# Patient Record
Sex: FEMALE | Race: WHITE
[De-identification: names, ages, dates, MRNs, and addresses within clinical notes are randomized per-mention and may not be internally consistent; named-entity substitution may affect disease eponyms.]

---

## 2018-02-16 ENCOUNTER — APPOINTMENT (OUTPATIENT)
Dept: FAMILY MEDICINE | Facility: CLINIC | Age: 43
End: 2018-02-16
Payer: COMMERCIAL

## 2018-02-16 ENCOUNTER — NON-APPOINTMENT (OUTPATIENT)
Age: 43
End: 2018-02-16

## 2018-02-16 ENCOUNTER — LABORATORY RESULT (OUTPATIENT)
Age: 43
End: 2018-02-16

## 2018-02-16 VITALS
OXYGEN SATURATION: 98 % | WEIGHT: 155 LBS | BODY MASS INDEX: 27.46 KG/M2 | DIASTOLIC BLOOD PRESSURE: 70 MMHG | HEIGHT: 63 IN | RESPIRATION RATE: 16 BRPM | HEART RATE: 84 BPM | SYSTOLIC BLOOD PRESSURE: 118 MMHG

## 2018-02-16 DIAGNOSIS — Z82.49 FAMILY HISTORY OF ISCHEMIC HEART DISEASE AND OTHER DISEASES OF THE CIRCULATORY SYSTEM: ICD-10-CM

## 2018-02-16 DIAGNOSIS — Z91.018 ALLERGY TO OTHER FOODS: ICD-10-CM

## 2018-02-16 DIAGNOSIS — K57.30 DIVERTICULOSIS OF LARGE INTESTINE W/OUT PERFORATION OR ABSCESS W/OUT BLEEDING: ICD-10-CM

## 2018-02-16 DIAGNOSIS — Z87.891 PERSONAL HISTORY OF NICOTINE DEPENDENCE: ICD-10-CM

## 2018-02-16 DIAGNOSIS — Z87.19 PERSONAL HISTORY OF OTHER DISEASES OF THE DIGESTIVE SYSTEM: ICD-10-CM

## 2018-02-16 DIAGNOSIS — Z83.79 FAMILY HISTORY OF OTHER DISEASES OF THE DIGESTIVE SYSTEM: ICD-10-CM

## 2018-02-16 LAB — CYTOLOGY CVX/VAG DOC THIN PREP: NORMAL

## 2018-02-16 PROCEDURE — 99386 PREV VISIT NEW AGE 40-64: CPT | Mod: 25

## 2018-02-16 PROCEDURE — 93000 ELECTROCARDIOGRAM COMPLETE: CPT

## 2018-02-16 RX ORDER — MOMETASONE FUROATE MONOHYDRATE 50 UG/1
SPRAY, METERED NASAL
Refills: 0 | Status: ACTIVE | COMMUNITY

## 2018-02-16 RX ORDER — ALBUTEROL SULFATE 90 UG/1
INHALANT RESPIRATORY (INHALATION)
Refills: 0 | Status: COMPLETED | COMMUNITY
End: 2018-02-16

## 2018-02-19 ENCOUNTER — TRANSCRIPTION ENCOUNTER (OUTPATIENT)
Age: 43
End: 2018-02-19

## 2018-02-20 ENCOUNTER — TRANSCRIPTION ENCOUNTER (OUTPATIENT)
Age: 43
End: 2018-02-20

## 2018-02-21 ENCOUNTER — APPOINTMENT (OUTPATIENT)
Dept: FAMILY MEDICINE | Facility: CLINIC | Age: 43
End: 2018-02-21
Payer: COMMERCIAL

## 2018-02-21 PROCEDURE — 36415 COLL VENOUS BLD VENIPUNCTURE: CPT

## 2018-02-23 LAB
25(OH)D3 SERPL-MCNC: 29.5 NG/ML
ALBUMIN SERPL ELPH-MCNC: 3.8 G/DL
ALP BLD-CCNC: 58 U/L
ALT SERPL-CCNC: 9 U/L
ANION GAP SERPL CALC-SCNC: 11 MMOL/L
APPEARANCE: ABNORMAL
AST SERPL-CCNC: 22 U/L
BASOPHILS # BLD AUTO: 0.02 K/UL
BASOPHILS NFR BLD AUTO: 0.6 %
BILIRUB SERPL-MCNC: 0.9 MG/DL
BILIRUBIN URINE: NEGATIVE
BLOOD URINE: ABNORMAL
BUN SERPL-MCNC: 17 MG/DL
CALCIUM SERPL-MCNC: 9.4 MG/DL
CHLORIDE SERPL-SCNC: 103 MMOL/L
CHOLEST SERPL-MCNC: 205 MG/DL
CHOLEST/HDLC SERPL: 3 RATIO
CO2 SERPL-SCNC: 26 MMOL/L
COLOR: YELLOW
CREAT SERPL-MCNC: 0.93 MG/DL
EOSINOPHIL # BLD AUTO: 0.18 K/UL
EOSINOPHIL NFR BLD AUTO: 5.2 %
GLUCOSE QUALITATIVE U: NEGATIVE MG/DL
GLUCOSE SERPL-MCNC: 93 MG/DL
HBA1C MFR BLD HPLC: 5.2 %
HCT VFR BLD CALC: 42.7 %
HDLC SERPL-MCNC: 69 MG/DL
HGB BLD-MCNC: 14.3 G/DL
IMM GRANULOCYTES NFR BLD AUTO: 0 %
IRON SATN MFR SERPL: 24 %
IRON SERPL-MCNC: 67 UG/DL
KETONES URINE: NEGATIVE
LDLC SERPL CALC-MCNC: 126 MG/DL
LEUKOCYTE ESTERASE URINE: NEGATIVE
LYMPHOCYTES # BLD AUTO: 1.03 K/UL
LYMPHOCYTES NFR BLD AUTO: 29.9 %
MAN DIFF?: NORMAL
MCHC RBC-ENTMCNC: 31.6 PG
MCHC RBC-ENTMCNC: 33.5 GM/DL
MCV RBC AUTO: 94.3 FL
MONOCYTES # BLD AUTO: 0.4 K/UL
MONOCYTES NFR BLD AUTO: 11.6 %
NEUTROPHILS # BLD AUTO: 1.81 K/UL
NEUTROPHILS NFR BLD AUTO: 52.7 %
NITRITE URINE: NEGATIVE
PH URINE: 7.5
PLATELET # BLD AUTO: 234 K/UL
POTASSIUM SERPL-SCNC: 4.2 MMOL/L
PROT SERPL-MCNC: 7.4 G/DL
PROTEIN URINE: NEGATIVE MG/DL
RBC # BLD: 4.53 M/UL
RBC # FLD: 13.6 %
SODIUM SERPL-SCNC: 140 MMOL/L
SPECIFIC GRAVITY URINE: 1.02
T4 FREE SERPL-MCNC: 1.2 NG/DL
TIBC SERPL-MCNC: 279 UG/DL
TRIGL SERPL-MCNC: 51 MG/DL
TSH SERPL-ACNC: 1.79 UIU/ML
UIBC SERPL-MCNC: 212 UG/DL
UROBILINOGEN URINE: NEGATIVE MG/DL
WBC # FLD AUTO: 3.44 K/UL

## 2018-02-27 ENCOUNTER — TRANSCRIPTION ENCOUNTER (OUTPATIENT)
Age: 43
End: 2018-02-27

## 2018-02-28 ENCOUNTER — LABORATORY RESULT (OUTPATIENT)
Age: 43
End: 2018-02-28

## 2018-02-28 ENCOUNTER — APPOINTMENT (OUTPATIENT)
Dept: FAMILY MEDICINE | Facility: CLINIC | Age: 43
End: 2018-02-28

## 2018-02-28 DIAGNOSIS — R82.90 UNSPECIFIED ABNORMAL FINDINGS IN URINE: ICD-10-CM

## 2018-03-05 ENCOUNTER — TRANSCRIPTION ENCOUNTER (OUTPATIENT)
Age: 43
End: 2018-03-05

## 2018-03-05 ENCOUNTER — CLINICAL ADVICE (OUTPATIENT)
Age: 43
End: 2018-03-05

## 2018-03-06 ENCOUNTER — OUTPATIENT (OUTPATIENT)
Dept: OUTPATIENT SERVICES | Facility: HOSPITAL | Age: 43
LOS: 1 days | Discharge: HOME | End: 2018-03-06

## 2018-03-06 DIAGNOSIS — D73.89 OTHER DISEASES OF SPLEEN: ICD-10-CM

## 2018-03-09 ENCOUNTER — RESULT REVIEW (OUTPATIENT)
Age: 43
End: 2018-03-09

## 2018-03-09 ENCOUNTER — APPOINTMENT (OUTPATIENT)
Dept: HEMATOLOGY ONCOLOGY | Facility: CLINIC | Age: 43
End: 2018-03-09
Payer: COMMERCIAL

## 2018-03-09 VITALS
SYSTOLIC BLOOD PRESSURE: 123 MMHG | BODY MASS INDEX: 26.91 KG/M2 | RESPIRATION RATE: 16 BRPM | OXYGEN SATURATION: 98 % | TEMPERATURE: 98.9 F | DIASTOLIC BLOOD PRESSURE: 79 MMHG | HEIGHT: 64.33 IN | WEIGHT: 157.63 LBS | HEART RATE: 85 BPM

## 2018-03-09 LAB
BASOPHILS # BLD AUTO: 0.1 K/UL — SIGNIFICANT CHANGE UP (ref 0–0.2)
BASOPHILS NFR BLD AUTO: 1.3 % — SIGNIFICANT CHANGE UP (ref 0–2)
EOSINOPHIL # BLD AUTO: 0.2 K/UL — SIGNIFICANT CHANGE UP (ref 0–0.5)
EOSINOPHIL NFR BLD AUTO: 3.4 % — SIGNIFICANT CHANGE UP (ref 0–6)
HCT VFR BLD CALC: 39.9 % — SIGNIFICANT CHANGE UP (ref 34.5–45)
HGB BLD-MCNC: 14.1 G/DL — SIGNIFICANT CHANGE UP (ref 11.5–15.5)
LYMPHOCYTES # BLD AUTO: 1.3 K/UL — SIGNIFICANT CHANGE UP (ref 1–3.3)
LYMPHOCYTES # BLD AUTO: 26.8 % — SIGNIFICANT CHANGE UP (ref 13–44)
MCHC RBC-ENTMCNC: 32 PG — SIGNIFICANT CHANGE UP (ref 27–34)
MCHC RBC-ENTMCNC: 35.3 G/DL — SIGNIFICANT CHANGE UP (ref 32–36)
MCV RBC AUTO: 90.6 FL — SIGNIFICANT CHANGE UP (ref 80–100)
MONOCYTES # BLD AUTO: 0.5 K/UL — SIGNIFICANT CHANGE UP (ref 0–0.9)
MONOCYTES NFR BLD AUTO: 9.8 % — SIGNIFICANT CHANGE UP (ref 2–14)
NEUTROPHILS # BLD AUTO: 2.8 K/UL — SIGNIFICANT CHANGE UP (ref 1.8–7.4)
NEUTROPHILS NFR BLD AUTO: 58.7 % — SIGNIFICANT CHANGE UP (ref 43–77)
PLATELET # BLD AUTO: 245 K/UL — SIGNIFICANT CHANGE UP (ref 150–400)
RBC # BLD: 4.4 M/UL — SIGNIFICANT CHANGE UP (ref 3.8–5.2)
RBC # FLD: 11.3 % — SIGNIFICANT CHANGE UP (ref 10.3–14.5)
WBC # BLD: 4.7 K/UL — SIGNIFICANT CHANGE UP (ref 3.8–10.5)
WBC # FLD AUTO: 4.7 K/UL — SIGNIFICANT CHANGE UP (ref 3.8–10.5)

## 2018-03-09 PROCEDURE — 99204 OFFICE O/P NEW MOD 45 MIN: CPT

## 2018-04-25 ENCOUNTER — TRANSCRIPTION ENCOUNTER (OUTPATIENT)
Age: 43
End: 2018-04-25

## 2018-04-25 DIAGNOSIS — D73.9 DISEASE OF SPLEEN, UNSPECIFIED: ICD-10-CM

## 2018-04-26 ENCOUNTER — APPOINTMENT (OUTPATIENT)
Dept: FAMILY MEDICINE | Facility: CLINIC | Age: 43
End: 2018-04-26
Payer: COMMERCIAL

## 2018-04-26 VITALS
SYSTOLIC BLOOD PRESSURE: 108 MMHG | HEART RATE: 88 BPM | DIASTOLIC BLOOD PRESSURE: 62 MMHG | OXYGEN SATURATION: 96 % | BODY MASS INDEX: 26.46 KG/M2 | HEIGHT: 64 IN | WEIGHT: 155 LBS | RESPIRATION RATE: 16 BRPM | TEMPERATURE: 98.2 F

## 2018-04-26 DIAGNOSIS — M25.512 PAIN IN LEFT SHOULDER: ICD-10-CM

## 2018-04-26 PROCEDURE — 99213 OFFICE O/P EST LOW 20 MIN: CPT

## 2018-04-26 RX ORDER — SODIUM SULFATE, POTASSIUM SULFATE, MAGNESIUM SULFATE 17.5; 3.13; 1.6 G/ML; G/ML; G/ML
17.5-3.13-1.6 SOLUTION, CONCENTRATE ORAL
Qty: 354 | Refills: 0 | Status: COMPLETED | COMMUNITY
Start: 2018-02-16

## 2018-04-26 RX ORDER — METRONIDAZOLE 7.5 MG/G
0.75 GEL VAGINAL
Qty: 70 | Refills: 0 | Status: COMPLETED | COMMUNITY
Start: 2017-12-29

## 2018-04-26 RX ORDER — FLUCONAZOLE 150 MG/1
150 TABLET ORAL
Qty: 2 | Refills: 0 | Status: COMPLETED | COMMUNITY
Start: 2018-04-12

## 2018-06-15 ENCOUNTER — LABORATORY RESULT (OUTPATIENT)
Age: 43
End: 2018-06-15

## 2018-06-15 ENCOUNTER — APPOINTMENT (OUTPATIENT)
Dept: PULMONOLOGY | Facility: CLINIC | Age: 43
End: 2018-06-15
Payer: COMMERCIAL

## 2018-06-15 VITALS
DIASTOLIC BLOOD PRESSURE: 60 MMHG | WEIGHT: 152 LBS | TEMPERATURE: 98.2 F | RESPIRATION RATE: 16 BRPM | HEIGHT: 64 IN | SYSTOLIC BLOOD PRESSURE: 100 MMHG | OXYGEN SATURATION: 96 % | HEART RATE: 86 BPM | BODY MASS INDEX: 25.95 KG/M2

## 2018-06-15 DIAGNOSIS — R20.2 ANESTHESIA OF SKIN: ICD-10-CM

## 2018-06-15 DIAGNOSIS — R20.0 ANESTHESIA OF SKIN: ICD-10-CM

## 2018-06-15 PROCEDURE — 36415 COLL VENOUS BLD VENIPUNCTURE: CPT

## 2018-06-15 PROCEDURE — 99245 OFF/OP CONSLTJ NEW/EST HI 55: CPT | Mod: 25

## 2018-06-17 LAB
25(OH)D3 SERPL-MCNC: 37.5 NG/ML
ALBUMIN SERPL ELPH-MCNC: 4.4 G/DL
ALP BLD-CCNC: 68 U/L
ALT SERPL-CCNC: 16 U/L
ANION GAP SERPL CALC-SCNC: 14 MMOL/L
APTT BLD: 31.4 SEC
AST SERPL-CCNC: 27 U/L
BASOPHILS # BLD AUTO: 0.02 K/UL
BASOPHILS NFR BLD AUTO: 0.6 %
BILIRUB SERPL-MCNC: 1.4 MG/DL
BUN SERPL-MCNC: 16 MG/DL
CALCIUM SERPL-MCNC: 9.7 MG/DL
CHLORIDE SERPL-SCNC: 100 MMOL/L
CO2 SERPL-SCNC: 26 MMOL/L
CREAT SERPL-MCNC: 0.95 MG/DL
DEPRECATED KAPPA LC FREE/LAMBDA SER: 1.02 RATIO
EOSINOPHIL # BLD AUTO: 0.18 K/UL
EOSINOPHIL NFR BLD AUTO: 5.1 %
FREE T4-ESOTERIX: 1.35 NG/DL
GLUCOSE SERPL-MCNC: 79 MG/DL
HCT VFR BLD CALC: 42.8 %
HGB BLD-MCNC: 14.5 G/DL
IGA SER QL IEP: 315 MG/DL
IGG SER QL IEP: 1314 MG/DL
IGM SER QL IEP: 127 MG/DL
IMM GRANULOCYTES NFR BLD AUTO: 0 %
INR PPP: 0.93 RATIO
KAPPA LC CSF-MCNC: 1.87 MG/DL
KAPPA LC SERPL-MCNC: 1.9 MG/DL
LDH SERPL-CCNC: 280 U/L
LYMPHOCYTES # BLD AUTO: 1.02 K/UL
LYMPHOCYTES NFR BLD AUTO: 29.1 %
MAN DIFF?: NORMAL
MCHC RBC-ENTMCNC: 31.5 PG
MCHC RBC-ENTMCNC: 33.9 GM/DL
MCV RBC AUTO: 93 FL
MONOCYTES # BLD AUTO: 0.42 K/UL
MONOCYTES NFR BLD AUTO: 12 %
NEUTROPHILS # BLD AUTO: 1.87 K/UL
NEUTROPHILS NFR BLD AUTO: 53.2 %
PLATELET # BLD AUTO: 238 K/UL
POTASSIUM SERPL-SCNC: 3.9 MMOL/L
PROT SERPL-MCNC: 7.7 G/DL
PT BLD: 10.5 SEC
RBC # BLD: 4.6 M/UL
RBC # FLD: 12.5 %
SODIUM SERPL-SCNC: 140 MMOL/L
T3 SERPL-MCNC: 116 NG/DL
T3RU NFR SERPL: 0.96 INDEX
T4 FREE SERPL-MCNC: 1.3 NG/DL
T4 SERPL-MCNC: 7.7 UG/DL
TSH SERPL-ACNC: 2.54 UIU/ML
VIT B12 SERPL-MCNC: 631 PG/ML
WBC # FLD AUTO: 3.51 K/UL

## 2018-06-18 LAB
ACE BLD-CCNC: 69 U/L
THYROGLOB AB SERPL-ACNC: <20 IU/ML
THYROPEROXIDASE AB SERPL IA-ACNC: 19 IU/ML

## 2018-06-19 LAB — TOTAL IGE SMQN RAST: 125 KU/L

## 2018-07-06 ENCOUNTER — MOBILE ON CALL (OUTPATIENT)
Age: 43
End: 2018-07-06

## 2018-07-09 ENCOUNTER — CHART COPY (OUTPATIENT)
Age: 43
End: 2018-07-09

## 2018-07-13 ENCOUNTER — APPOINTMENT (OUTPATIENT)
Dept: PULMONOLOGY | Facility: CLINIC | Age: 43
End: 2018-07-13

## 2018-07-30 ENCOUNTER — APPOINTMENT (OUTPATIENT)
Dept: FAMILY MEDICINE | Facility: CLINIC | Age: 43
End: 2018-07-30
Payer: COMMERCIAL

## 2018-07-30 VITALS
OXYGEN SATURATION: 97 % | HEART RATE: 86 BPM | TEMPERATURE: 98.3 F | RESPIRATION RATE: 16 BRPM | BODY MASS INDEX: 26.12 KG/M2 | HEIGHT: 64 IN | DIASTOLIC BLOOD PRESSURE: 72 MMHG | WEIGHT: 153 LBS | SYSTOLIC BLOOD PRESSURE: 110 MMHG

## 2018-07-30 DIAGNOSIS — Z86.69 PERSONAL HISTORY OF OTHER DISEASES OF THE NERVOUS SYSTEM AND SENSE ORGANS: ICD-10-CM

## 2018-07-30 PROCEDURE — 99213 OFFICE O/P EST LOW 20 MIN: CPT

## 2018-07-30 RX ORDER — OMEPRAZOLE 20 MG/1
20 CAPSULE, DELAYED RELEASE ORAL
Qty: 1 | Refills: 5 | Status: COMPLETED | COMMUNITY
Start: 2018-06-15 | End: 2018-07-30

## 2018-07-30 RX ORDER — NAPROXEN 500 MG/1
500 TABLET ORAL
Qty: 28 | Refills: 0 | Status: COMPLETED | COMMUNITY
Start: 2018-04-26 | End: 2018-07-30

## 2018-07-30 RX ORDER — CETIRIZINE HCL 10 MG
TABLET ORAL
Refills: 0 | Status: COMPLETED | COMMUNITY
End: 2018-07-30

## 2018-07-30 RX ORDER — PREDNISONE 10 MG/1
10 TABLET ORAL DAILY
Qty: 60 | Refills: 1 | Status: COMPLETED | COMMUNITY
Start: 2018-06-15 | End: 2018-07-30

## 2018-07-30 NOTE — ASSESSMENT
[FreeTextEntry1] : Conjunctivitis\par - ofloxacin drops for 7 days\par - warms compresses\par - hygiene discussed - do not touch eyes, use tissues to wipe eyes, wash towels and pillowcases frequently

## 2018-07-30 NOTE — HISTORY OF PRESENT ILLNESS
[FreeTextEntry8] : Patient presents today with possible conjunctivitis of the right eye.  It started two days ago that she noticed her eye was red.  She thought it might be allergies so she took some allergy drops.  This did not help.  The next day she woke up and her eye was crusted shut and oozing and the redness was much worse.  She also noticed it was starting to affect her left eye slightly.  She had left over ofloxacin drops and used them today.  Her eye appears better since starting the drops.

## 2018-07-30 NOTE — PHYSICAL EXAM
[No Acute Distress] : no acute distress [Well Nourished] : well nourished [Well Developed] : well developed [PERRL] : pupils equal round and reactive to light [Normal Outer Ear/Nose] : the outer ears and nose were normal in appearance [Normal Oropharynx] : the oropharynx was normal [Supple] : supple [No Lymphadenopathy] : no lymphadenopathy [No Respiratory Distress] : no respiratory distress  [Clear to Auscultation] : lungs were clear to auscultation bilaterally [No Accessory Muscle Use] : no accessory muscle use [Normal Rate] : normal rate  [Regular Rhythm] : with a regular rhythm [Normal S1, S2] : normal S1 and S2 [Normal Gait] : normal gait [Normal Affect] : the affect was normal [Normal Insight/Judgement] : insight and judgment were intact [de-identified] : erythema of right eye conjunctiva and crusting, left eye mild irritation

## 2018-08-03 ENCOUNTER — RESULT REVIEW (OUTPATIENT)
Age: 43
End: 2018-08-03

## 2018-10-04 ENCOUNTER — APPOINTMENT (OUTPATIENT)
Dept: FAMILY MEDICINE | Facility: CLINIC | Age: 43
End: 2018-10-04
Payer: COMMERCIAL

## 2018-10-04 VITALS
WEIGHT: 157 LBS | SYSTOLIC BLOOD PRESSURE: 130 MMHG | BODY MASS INDEX: 26.8 KG/M2 | HEIGHT: 64 IN | RESPIRATION RATE: 16 BRPM | HEART RATE: 94 BPM | OXYGEN SATURATION: 96 % | DIASTOLIC BLOOD PRESSURE: 80 MMHG | TEMPERATURE: 98 F

## 2018-10-04 LAB — CYTOLOGY CVX/VAG DOC THIN PREP: NORMAL

## 2018-10-04 PROCEDURE — 99214 OFFICE O/P EST MOD 30 MIN: CPT

## 2018-10-04 RX ORDER — OFLOXACIN 3 MG/ML
0.3 SOLUTION/ DROPS OPHTHALMIC 4 TIMES DAILY
Qty: 1 | Refills: 0 | Status: DISCONTINUED | COMMUNITY
Start: 2018-07-30 | End: 2018-10-04

## 2019-01-04 ENCOUNTER — APPOINTMENT (OUTPATIENT)
Dept: PULMONOLOGY | Facility: CLINIC | Age: 44
End: 2019-01-04
Payer: COMMERCIAL

## 2019-01-04 VITALS
WEIGHT: 157 LBS | OXYGEN SATURATION: 98 % | SYSTOLIC BLOOD PRESSURE: 118 MMHG | BODY MASS INDEX: 26.8 KG/M2 | RESPIRATION RATE: 16 BRPM | HEIGHT: 64 IN | DIASTOLIC BLOOD PRESSURE: 79 MMHG | TEMPERATURE: 98 F | HEART RATE: 88 BPM

## 2019-01-04 PROCEDURE — 99215 OFFICE O/P EST HI 40 MIN: CPT

## 2019-01-04 RX ORDER — CEPHALEXIN 500 MG/1
500 TABLET ORAL 3 TIMES DAILY
Qty: 21 | Refills: 0 | Status: DISCONTINUED | COMMUNITY
Start: 2018-10-04 | End: 2019-01-04

## 2019-02-12 ENCOUNTER — APPOINTMENT (OUTPATIENT)
Dept: THORACIC SURGERY | Facility: CLINIC | Age: 44
End: 2019-02-12
Payer: COMMERCIAL

## 2019-02-12 VITALS
TEMPERATURE: 98.1 F | RESPIRATION RATE: 16 BRPM | BODY MASS INDEX: 28 KG/M2 | SYSTOLIC BLOOD PRESSURE: 113 MMHG | DIASTOLIC BLOOD PRESSURE: 71 MMHG | HEART RATE: 82 BPM | WEIGHT: 164 LBS | HEIGHT: 64 IN | OXYGEN SATURATION: 96 %

## 2019-02-12 DIAGNOSIS — R59.0 LOCALIZED ENLARGED LYMPH NODES: ICD-10-CM

## 2019-02-12 PROCEDURE — 99205 OFFICE O/P NEW HI 60 MIN: CPT

## 2019-02-12 RX ORDER — EPINEPHRINE 0.3 MG/.3ML
0.3 INJECTION INTRAMUSCULAR
Qty: 2 | Refills: 3 | Status: COMPLETED | COMMUNITY
Start: 2018-02-16 | End: 2019-02-12

## 2019-02-12 NOTE — PHYSICAL EXAM
[General Appearance - Alert] : alert [General Appearance - In No Acute Distress] : in no acute distress [Sclera] : the sclera and conjunctiva were normal [PERRL With Normal Accommodation] : pupils were equal in size, round, and reactive to light [Extraocular Movements] : extraocular movements were intact [Outer Ear] : the ears and nose were normal in appearance [Oropharynx] : the oropharynx was normal [Neck Appearance] : the appearance of the neck was normal [Neck Cervical Mass (___cm)] : no neck mass was observed [Jugular Venous Distention Increased] : there was no jugular-venous distention [Thyroid Diffuse Enlargement] : the thyroid was not enlarged [Thyroid Nodule] : there were no palpable thyroid nodules [Auscultation Breath Sounds / Voice Sounds] : lungs were clear to auscultation bilaterally [Heart Rate And Rhythm] : heart rate was normal and rhythm regular [Heart Sounds] : normal S1 and S2 [Heart Sounds Gallop] : no gallops [Murmurs] : no murmurs [Heart Sounds Pericardial Friction Rub] : no pericardial rub [Examination Of The Chest] : the chest was normal in appearance [Chest Visual Inspection Thoracic Asymmetry] : no chest asymmetry [Diminished Respiratory Excursion] : normal chest expansion [Full Pulse] : the pedal pulses are present [Edema] : there was no peripheral edema [Breast Appearance] : normal in appearance [Breast Palpation Mass] : no palpable masses [Bowel Sounds] : normal bowel sounds [Abdomen Soft] : soft [Abdomen Tenderness] : non-tender [Abdomen Mass (___ Cm)] : no abdominal mass palpated [Cervical Lymph Nodes Enlarged Posterior Bilaterally] : posterior cervical [Cervical Lymph Nodes Enlarged Anterior Bilaterally] : anterior cervical [Supraclavicular Lymph Nodes Enlarged Bilaterally] : supraclavicular [Axillary Lymph Nodes Enlarged Bilaterally] : axillary [Femoral Lymph Nodes Enlarged Bilaterally] : femoral [Inguinal Lymph Nodes Enlarged Bilaterally] : inguinal [No CVA Tenderness] : no ~M costovertebral angle tenderness [No Spinal Tenderness] : no spinal tenderness [Abnormal Walk] : normal gait [Nail Clubbing] : no clubbing  or cyanosis of the fingernails [Musculoskeletal - Swelling] : no joint swelling seen [Motor Tone] : muscle strength and tone were normal [Skin Color & Pigmentation] : normal skin color and pigmentation [Skin Turgor] : normal skin turgor [] : no rash [Deep Tendon Reflexes (DTR)] : deep tendon reflexes were 2+ and symmetric [Sensation] : the sensory exam was normal to light touch and pinprick [No Focal Deficits] : no focal deficits [Oriented To Time, Place, And Person] : oriented to person, place, and time [Impaired Insight] : insight and judgment were intact [Affect] : the affect was normal

## 2019-02-12 NOTE — CONSULT LETTER
[Dear  ___] : Dear ~МАРИНА, [Consult Letter:] : I had the pleasure of evaluating your patient, [unfilled]. [Please see my note below.] : Please see my note below. [Consult Closing:] : Thank you very much for allowing me to participate in the care of this patient.  If you have any questions, please do not hesitate to contact me. [Sincerely,] : Sincerely, [DrBeth  ___] : Dr. SALINAS [DrBeth ___] : Dr. SALINAS

## 2019-02-17 NOTE — ASSESSMENT
[FreeTextEntry1] : 43F pmhx: diverticulitis, asthma, thyroid nodules, gestational diabetes, lymphadenopathy, former smoker 9 pk.yr quit 20 yrs ago. Incidentally noted on a chest pain workup to have multiple splenic nodules. Pt recently underwent an FNA of right groin LAD revealing granulomatous lymphadenitis. Pt denies SOB, chest pain, fevers, chills, hemoptysis\par \par CT Abd/pel 4/17/17 -distal sigmoid diverticulitis\par -interval decrease in mediastinal and hilar adenopathy\par - scattered low attenuation splenic lesions\par \par PET/CT 5/17/18- ill defined nodular lesion in left lung apex FDG avid( SUV 3.7) approx 1.5 x 1.2cm\par - FDG avid enlarging mediastinal and hilar lymph nodes\par - FDG avid numerous splenic lesions\par \par CT chest 12/7/18 - mod mediastinal and bilateral hialr adenopathy w/ multiple splenic lesions\par - irregularly shaped DOUG parenchymal opacity\par -interval development of peribronchial nodularity in L apex, ant RUL\par \par I have reviewed the patient's medical records, diagnostic images during the time of this office consultation and have made the following recommendation.\par Pt offered Left VATS w/ DOUG wedge resection, hilar lymph node biopsy, Bronchoscopy, EBUS, or surveillance imaging. Pt asked for the week to decide on her options.\par Pt recommended to repeat  PET/CT r/o distant metastasis and to preoperatively get cardiac clearance and PFTs

## 2019-02-17 NOTE — HISTORY OF PRESENT ILLNESS
[FreeTextEntry1] : 43F pmhx: diverticulitis, asthma, thyroid nodules, gestational diabetes, lymphadenopathy, former smoker 9 pk.yr quit 20 yrs ago. Incidentally noted on a chest pain workup to have multiple splenic nodules. Pt recently underwent an FNA of right groin LAD revealing granulomatous lymphadenitis. Pt denies SOB, chest pain, fevers, chills, hemoptysis\par \par CT Abd/pel 4/17/17 -distal sigmoid diverticulitis\par -interval decrease in mediastinal and hilar adenopathy\par - scattered low attenuation splenic lesions\par \par PET/CT 5/17/18- ill defined nodular lesion in left lung apex FDG avid( SUV 3.7) approx 1.5 x 1.2cm\par - FDG avid enlarging mediastinal and hilar lymph nodes\par - FDG avid numerous splenic lesions\par \par CT chest 12/7/18 - mod mediastinal and bilateral hialr adenopathy w/ multiple splenic lesions\par - irregularly shaped DOUG parenchymal opacity\par -interval development of peribronchial nodularity in L apex, ant RUL

## 2019-03-01 ENCOUNTER — TRANSCRIPTION ENCOUNTER (OUTPATIENT)
Age: 44
End: 2019-03-01

## 2019-03-01 ENCOUNTER — APPOINTMENT (OUTPATIENT)
Dept: PULMONOLOGY | Facility: CLINIC | Age: 44
End: 2019-03-01

## 2019-03-07 NOTE — PHYSICAL EXAM
[General Appearance - Well Developed] : well developed [Normal Appearance] : normal appearance [Well Groomed] : well groomed [General Appearance - Well Nourished] : well nourished [No Deformities] : no deformities [General Appearance - In No Acute Distress] : no acute distress [Normal Conjunctiva] : the conjunctiva exhibited no abnormalities [Eyelids - No Xanthelasma] : the eyelids demonstrated no xanthelasmas [II] : II [Neck Appearance] : the appearance of the neck was normal [Neck Cervical Mass (___cm)] : no neck mass was observed [Jugular Venous Distention Increased] : there was no jugular-venous distention [Thyroid Diffuse Enlargement] : the thyroid was not enlarged [Thyroid Nodule] : there were no palpable thyroid nodules [Heart Rate And Rhythm] : heart rate and rhythm were normal [Heart Sounds] : normal S1 and S2 [Murmurs] : no murmurs present [Respiration, Rhythm And Depth] : normal respiratory rhythm and effort [Exaggerated Use Of Accessory Muscles For Inspiration] : no accessory muscle use [Auscultation Breath Sounds / Voice Sounds] : lungs were clear to auscultation bilaterally [Tenderness: ____] : tenderness [unfilled] [Abdomen Soft] : soft [Abdomen Tenderness] : non-tender [Abdomen Mass (___ Cm)] : no abdominal mass palpated [Abnormal Walk] : normal gait [Gait - Sufficient For Exercise Testing] : the gait was sufficient for exercise testing [Nail Clubbing] : no clubbing of the fingernails [Cyanosis, Localized] : no localized cyanosis [Petechial Hemorrhages (___cm)] : no petechial hemorrhages [Deep Tendon Reflexes (DTR)] : deep tendon reflexes were 2+ and symmetric [Sensation] : the sensory exam was normal to light touch and pinprick [No Focal Deficits] : no focal deficits [Oriented To Time, Place, And Person] : oriented to person, place, and time [Impaired Insight] : insight and judgment were intact [Affect] : the affect was normal [Skin Color & Pigmentation] : normal skin color and pigmentation [Skin Turgor] : normal skin turgor [] : no rash

## 2019-03-08 ENCOUNTER — APPOINTMENT (OUTPATIENT)
Dept: PULMONOLOGY | Facility: CLINIC | Age: 44
End: 2019-03-08
Payer: COMMERCIAL

## 2019-03-08 ENCOUNTER — NON-APPOINTMENT (OUTPATIENT)
Age: 44
End: 2019-03-08

## 2019-03-08 VITALS
HEIGHT: 64 IN | TEMPERATURE: 98.1 F | SYSTOLIC BLOOD PRESSURE: 107 MMHG | DIASTOLIC BLOOD PRESSURE: 71 MMHG | WEIGHT: 160 LBS | HEART RATE: 82 BPM | BODY MASS INDEX: 27.31 KG/M2 | RESPIRATION RATE: 16 BRPM

## 2019-03-08 PROCEDURE — 93000 ELECTROCARDIOGRAM COMPLETE: CPT

## 2019-03-08 PROCEDURE — 99215 OFFICE O/P EST HI 40 MIN: CPT | Mod: 25

## 2019-03-08 NOTE — HISTORY OF PRESENT ILLNESS
[FreeTextEntry1] : This letter  is regarding your patient  who  attended pulmonary out patient office today.  I have reviewed  patient's  past history, social history, family history and medication list. I also  reviewed nurse practitioners/ and fellows  notes and assessment and agree with it.  \par The patient was referred by  for  ? sarcoidosis, abn CT/PET. Not symptomatic.\par Being w/u by heme for splenic lesions/splenomegaly.---has followedd césar carmen mcdonough in the past at [facility]-------WAS TOLD SHE HAS SARCOIDOSIS---FORMER SMOKER---\par \par Presented 2015 w/chest pain- CTA ruled out PE but incidental finding was lymphadenopathy. 2016- CT noted splenic lesions. Recent imaging shows lung nodule and increasing splenic lesions\par former smoker, quit at age 24, 9 pack year history. Works as sonographer\par \par pmh of asthma- controlled w/adviar, albuterol prn, daily zyrtec and nasonex\par \par ------No history of , fever, chills , rigors, chest pain, or hemoptysis. Questionable history of Raynaud's phenomenon. No h/o significant weight loss in last few months. No history of liver dysfunction , collagen vascular disorder or chronic thromboembolic disease. 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 Follows cardiology at [place]

## 2019-03-08 NOTE — DISCUSSION/SUMMARY
[FreeTextEntry1] : ---Assessment plan----------The patient has been referred here for further opinion regarding pulmonary problem, 45 yo with CLINICAL AND RADIOLOGICAL FEATURES SUGGESTIVE OF SARCOIDOSIS  sarcoidosis, abn CT/PET.[  mediastinal and hilar  adenopathy  Not symptomatic.]\par Being w/u by heme for splenic lesions/splenomegaly.,\par \par 1 sarcoidosis----VERY CONCERNED ABOUT PROGRESSION OF SARCOIDOSIS AS THE 12/2108 CT SHOWS MORE PARENCHYMAL INVOLVEMENT, o/s PET scan and EBUS with Dr Olivera\par \par 2- chest pain- s/p ekg w/conduction delay- follows cardiology- will get records- needs  cardiac MRI to r/o cardiac sarcoidosis\par \par 3- s/p MRI of brain and neuro w/u to r/o neurosarcoid\par 4 CARDIAC MRI\par f/u in 8 weeks\par \par \par Thanks for allowing  me to participate  in the care of this patient.  Patient at this time  will follow  the above mentioned recommendations and return back for follow up visit. If you have any questions  I can be reached  at #[phone] (beeper)  or  [phone] (office).\par \par Gareth Cadet MD, FCCP \par Director, Pulmonary Hypertension Program \par [facility] \par Division of Pulmonary, Critical Care and Sleep Medicine \par  Professor of Medicine \par [employer]\par

## 2019-03-22 ENCOUNTER — OUTPATIENT (OUTPATIENT)
Dept: OUTPATIENT SERVICES | Facility: HOSPITAL | Age: 44
LOS: 1 days | End: 2019-03-22

## 2019-03-22 VITALS
HEART RATE: 80 BPM | OXYGEN SATURATION: 96 % | HEIGHT: 63 IN | TEMPERATURE: 98 F | SYSTOLIC BLOOD PRESSURE: 110 MMHG | WEIGHT: 156.09 LBS | DIASTOLIC BLOOD PRESSURE: 80 MMHG | RESPIRATION RATE: 16 BRPM

## 2019-03-22 DIAGNOSIS — R91.1 SOLITARY PULMONARY NODULE: ICD-10-CM

## 2019-03-22 DIAGNOSIS — J45.909 UNSPECIFIED ASTHMA, UNCOMPLICATED: ICD-10-CM

## 2019-03-22 DIAGNOSIS — K08.409 PARTIAL LOSS OF TEETH, UNSPECIFIED CAUSE, UNSPECIFIED CLASS: Chronic | ICD-10-CM

## 2019-03-22 DIAGNOSIS — Z01.818 ENCOUNTER FOR OTHER PREPROCEDURAL EXAMINATION: ICD-10-CM

## 2019-03-22 DIAGNOSIS — Z98.890 OTHER SPECIFIED POSTPROCEDURAL STATES: Chronic | ICD-10-CM

## 2019-03-22 DIAGNOSIS — T78.40XA ALLERGY, UNSPECIFIED, INITIAL ENCOUNTER: ICD-10-CM

## 2019-03-22 DIAGNOSIS — R07.9 CHEST PAIN, UNSPECIFIED: ICD-10-CM

## 2019-03-22 LAB
ANION GAP SERPL CALC-SCNC: 14 MMO/L — SIGNIFICANT CHANGE UP (ref 7–14)
BUN SERPL-MCNC: 14 MG/DL — SIGNIFICANT CHANGE UP (ref 7–23)
CALCIUM SERPL-MCNC: 9.5 MG/DL — SIGNIFICANT CHANGE UP (ref 8.4–10.5)
CHLORIDE SERPL-SCNC: 101 MMOL/L — SIGNIFICANT CHANGE UP (ref 98–107)
CO2 SERPL-SCNC: 24 MMOL/L — SIGNIFICANT CHANGE UP (ref 22–31)
CREAT SERPL-MCNC: 0.85 MG/DL — SIGNIFICANT CHANGE UP (ref 0.5–1.3)
GLUCOSE SERPL-MCNC: 80 MG/DL — SIGNIFICANT CHANGE UP (ref 70–99)
HCT VFR BLD CALC: 41.5 % — SIGNIFICANT CHANGE UP (ref 34.5–45)
HGB BLD-MCNC: 14.1 G/DL — SIGNIFICANT CHANGE UP (ref 11.5–15.5)
MCHC RBC-ENTMCNC: 30.9 PG — SIGNIFICANT CHANGE UP (ref 27–34)
MCHC RBC-ENTMCNC: 34 % — SIGNIFICANT CHANGE UP (ref 32–36)
MCV RBC AUTO: 91 FL — SIGNIFICANT CHANGE UP (ref 80–100)
NRBC # FLD: 0 K/UL — LOW (ref 25–125)
PLATELET # BLD AUTO: 257 K/UL — SIGNIFICANT CHANGE UP (ref 150–400)
PMV BLD: 9.7 FL — SIGNIFICANT CHANGE UP (ref 7–13)
POTASSIUM SERPL-MCNC: 3.8 MMOL/L — SIGNIFICANT CHANGE UP (ref 3.5–5.3)
POTASSIUM SERPL-SCNC: 3.8 MMOL/L — SIGNIFICANT CHANGE UP (ref 3.5–5.3)
RBC # BLD: 4.56 M/UL — SIGNIFICANT CHANGE UP (ref 3.8–5.2)
RBC # FLD: 11.9 % — SIGNIFICANT CHANGE UP (ref 10.3–14.5)
SODIUM SERPL-SCNC: 139 MMOL/L — SIGNIFICANT CHANGE UP (ref 135–145)
WBC # BLD: 3.3 K/UL — LOW (ref 3.8–10.5)
WBC # FLD AUTO: 3.3 K/UL — LOW (ref 3.8–10.5)

## 2019-03-22 RX ORDER — SODIUM CHLORIDE 9 MG/ML
1000 INJECTION, SOLUTION INTRAVENOUS
Qty: 0 | Refills: 0 | Status: DISCONTINUED | OUTPATIENT
Start: 2019-04-01 | End: 2019-04-01

## 2019-03-22 NOTE — H&P PST ADULT - NSANTHOSAYNRD_GEN_A_CORE
No. ROBB screening performed.  STOP BANG Legend: 0-2 = LOW Risk; 3-4 = INTERMEDIATE Risk; 5-8 = HIGH Risk

## 2019-03-22 NOTE — H&P PST ADULT - NSICDXPASTSURGICALHX_GEN_ALL_CORE_FT
PAST SURGICAL HISTORY:  S/P dilation and curettage TOP 1993    [place] teeth extracted PAST SURGICAL HISTORY:  S/P dilation and curettage TOP 1993    [place] teeth extracted

## 2019-03-22 NOTE — H&P PST ADULT - NSICDXPROBLEM_GEN_ALL_CORE_FT
PROBLEM DIAGNOSES  Problem: Solitary pulmonary nodule  Assessment and Plan: Scheduled for bronchoscopy, endobronchial ultrasound with cytology on 4/1/2019. labs done and results pending. Surgical scrub & preop instruction given and explained. Famotidine provided with instruction. Verbalized understanding. Instructed to bring urine sample  on the day of surgery for UCG. Container provided.     Problem: Chest pain  Assessment and Plan: Patient is c/o mild intermittent chest pain - cardiac evaluation was requested by surgeon. Will obtain copy for PST.     Problem: Asthma  Assessment and Plan: Instructed to take Advair AM of surgery.     Problem: Allergy  Assessment and Plan: Allergic to latex, adhesive. OR booking was notified via fax. PROBLEM DIAGNOSES  Problem: Solitary pulmonary nodule  Assessment and Plan: Scheduled for bronchoscopy, endobronchial ultrasound with cytology on 4/1/2019. labs done and results pending. Surgical scrub & preop instruction given and explained. Famotidine provided with instruction. Verbalized understanding. Instructed to bring urine sample  on the day of surgery for UCG. Container provided.     Problem: Chest pain  Assessment and Plan: Patient is c/o mild intermittent chest pain - cardiac evaluation was requested by surgeon. - has an appt this PM. Will obtain copy for PST.     Problem: Asthma  Assessment and Plan: Instructed to take Advair AM of surgery. Last pulmonary function test in chart    Problem: Allergy  Assessment and Plan: Allergic to latex, adhesive. OR booking was notified via fax. PROBLEM DIAGNOSES  Problem: Solitary pulmonary nodule  Assessment and Plan: Scheduled for bronchoscopy, endobronchial ultrasound with cytology on 4/1/2019. labs done and results pending. Preop instruction given and explained. Famotidine provided with instruction. Verbalized understanding. Instructed to bring urine sample  on the day of surgery for UCG. Container provided. Medical evaluation requested by surgeon. - has an appt with PCP on 3/25/2019    Problem: Chest pain  Assessment and Plan: Patient is c/o mild intermittent chest pain - cardiac evaluation was requested by surgeon. - has an appt this PM. Will obtain copy for PST.     Problem: Asthma  Assessment and Plan: Instructed to take Advair AM of surgery. Last pulmonary function test in chart    Problem: Allergy  Assessment and Plan: Allergic to latex, adhesive. OR booking was notified via fax.

## 2019-03-22 NOTE — H&P PST ADULT - ALLERGY TYPES
outdoor environmental allergies/indoor environmental allergies/reactions to medicines/reactions to food/latex, adhesive outdoor environmental allergies/indoor environmental allergies/reactions to medicines/reactions to food/latex, adhesive, banana

## 2019-03-22 NOTE — H&P PST ADULT - CARDIOVASCULAR SYMPTOMS
"intermittent mild ache on right side of chest" "intermittent mild pain on chest" - scheduled for cardiac evaluation this PM

## 2019-03-22 NOTE — H&P PST ADULT - NEGATIVE GENERAL GENITOURINARY SYMPTOMS
no flank pain L/no urinary hesitancy/no hematuria/no bladder infections/normal urinary frequency/no flank pain R/no dysuria

## 2019-03-22 NOTE — H&P PST ADULT - HISTORY OF PRESENT ILLNESS
43 yo female with hx of asthma 45 yo female with hx of asthma, thyroid nodule presents to have PST evaluation for bronchoscopy, endobronchial ultrasound with cytology on 4/1/2019. Patient reports, hx of chest pain 3 yrs ago, had work up done - which revealed "splenic nodule, enlarged lymph node on chest", has been f/u with CT/PET scan. Last done in 3/2019. 43 yo female with hx of asthma, thyroid nodule presents to have PST evaluation for bronchoscopy, endobronchial ultrasound with cytology on 4/1/2019. Patient reports, hx of chest pain 3 yrs ago, had work up done - which revealed "splenic nodule, enlarged lymph node on chest", has been f/u with CT/PET scan- Last done in 3/2019, surgical intervention was recommended. 43 yo female with hx of asthma, thyroid nodule presents to have PST evaluation for bronchoscopy, endobronchial ultrasound with cytology on 4/1/2019. Patient reports, hx of chest pain 3 yrs ago, had work up done - which revealed "splenic nodule, enlarging lymph node on chest", has been f/u with CT/PET scan- Last done in 3/2019, surgical intervention was recommended.

## 2019-03-22 NOTE — H&P PST ADULT - NSICDXPASTMEDICALHX_GEN_ALL_CORE_FT
PAST MEDICAL HISTORY:  Asthma last eval with pulmonologist in 3/2019    Gestational diabetes     Thyroid nodule last evaluation in 2017 PAST MEDICAL HISTORY:  Asthma last eval with pulmonologist in 3/2019    Gestational diabetes     Hyperlipidemia     Thyroid nodule last evaluation in 2017

## 2019-03-22 NOTE — H&P PST ADULT - RS GEN PE MLT RESP DETAILS PC
respirations non-labored/no wheezes/no rales/good air movement/no rhonchi/clear to auscultation bilaterally

## 2019-03-22 NOTE — H&P PST ADULT - NEGATIVE NEUROLOGICAL SYMPTOMS
no headache/no transient paralysis/no paresthesias/no weakness/no syncope/no vertigo/no generalized seizures/no tremors/no focal seizures/no difficulty walking

## 2019-03-22 NOTE — H&P PST ADULT - NEGATIVE ENMT SYMPTOMS
no nasal congestion/no post-nasal discharge/no hearing difficulty/no vertigo/no sinus symptoms/no throat pain/no nasal obstruction/no dysphagia/no ear pain/no tinnitus/no nasal discharge

## 2019-03-22 NOTE — H&P PST ADULT - HEMATOLOGY/LYMPHATICS
Chief Complaint  7 yr old well exam      History of Present Illness  HPI: Mother has type 2 DM diet controlled  Aishwarya Ruiz is not very active  She drinks a lot of juice  Eats large portions  HM, 9-12 years, Female [facility]: The patient comes in today for routine health maintenance with her mother and sibling(s)  The last health maintenance visit was 2 years ago  General health since the last visit is described as good  Dental care includes good dental hygiene, brushing 2 time(s) daily and regular dental visits  Immunizations are needed and flu  No sensory or development concerns are expressed  The patient's menstrual status is premenarcheal  Current diet includes a normal healthy diet, 2 servings of fruit/day, 1 servings of vegetables/day, 2 servings of meat/day, 3 servings of starch/day and 24-32 ounces of skim milk/day  The patient does not use dietary supplements  No nutritional concerns are expressed  No elimination concerns are expressed  She sleeps for 9 hours at night  She sleeps with others  No sleep concerns are reported  snoring, but no sleep apnea witnessed  The child's temperament is described as happy and energetic  No behavioral concerns are noted  Method(s) of behavior modification include praise for good behavior, loss of privileges, loss of activities and discussion  No behavior modification concerns are expressed  Household risk factors:  passive smoking exposure, but no exposure to pets, no household substance abuse, no household domestic violence and no firearms in the house  Safety elements used:  seat belt, hot water temperature set below 120F, smoke detectors, carbon monoxide detectors and drowning precautions, but no safety helmet, no safety gupta/fences and no CPR training  Weekly activity includes 5 time(s) to exercise per week and 4 hour(s) of screen time per day  The patient denies sexual activity  Risk assessments performed include tuberculosis exposure   No significant risks were identified  When not in school, the child receives care from day care providers  Childcare is provided in a childcare center  She is in grade 4th in [employer] school  School performance has been good  No school issues are reported  She is involved in art, music and science  Review of Systems    Constitutional: No complaints of fever or chills, feels well, no tiredness, no recent weight gain or loss  Eyes: No complaints of eye pain, no discharge, no eyesight problems, eyes do not itch, no red or dry eyes  ENT: no complaints of nasal discharge, no hoarseness, no earache, no nosebleeds, no loss of hearing, no sore throat  Cardiovascular: no chest pain and no lower extremity edema  Respiratory: No complaints of cough, no shortness of breath, no wheezing, no leg claudication  Gastrointestinal: no nausea, no vomiting and no constipation  Genitourinary: no dysuria  Musculoskeletal: no limb swelling and no joint swelling  Integumentary: no rashes  Neurological: No complaints of headache, no numbness or tingling, no confusion, no dizziness, no limb weakness, no convulsions or fainting, no difficulty walking  Psychiatric: No complaints of feeling depressed, no suicidal thoughts, no emotional problems, no anxiety, no sleep disturbances, no change in personality  Hematologic/Lymphatic: No complaints of swollen glands, no neck swollen glands, does not bleed or bruise easily  ROS reported by the patient and the parent or guardian  Past Medical History    · History of Birth History    Family History    · Family history of Living Environment    Social History    · Marital History - Single   · Never A Smoker    Current Meds   1  [place]away 0 025 % Ophthalmic Solution; INSTILL 1 DROP IN THE AFFECTED EYE(S)   EVERY 12 HOURS AS NEEDED; Therapy: 20Apr2012 to (Last Mac Pugh)  Requested for: 20Apr2012 Ordered   2   Childrens Chewable Vitamins Oral Tablet Chewable; chew and swallow one tablet oraly   daily; Therapy: [id number] to (Last Alicia Majors)  Requested for: 20Apr2012 Ordered   3  Hydrocortisone 2 5 % External Ointment; APPLY TO AFFECTED AREA 3-4 TIMES DAILY   AS NEEDED; Therapy: 99RZZ6344 to (Last Rx:22Smw2572) Ordered   4  Loratadine 5 MG/5ML Oral Syrup; TAKE 1 TEASPOONFUL ONCE A DAY; Therapy: 20Apr2012 to (Last Alicia Majors)  Requested for: 13Uii8966 Ordered   5  No Reported Medications Recorded    Allergies    1  No Known Drug Allergies    Vitals   Recorded: 89MAE3579 98:07PT   Systolic 88   Diastolic 48   Height 888 2 cm   2-20 Stature Percentile 60 %   Weight 50 1 kg   2-20 Weight Percentile 96 %   BMI Calculated 25 38   BMI Percentile 97 %   BSA Calculated 1 37     Physical Exam    Constitutional - General Appearance: well appearing with no visible distress; no dysmorphic features  Head and Face - Head and face: Normocephalic atraumatic  Palpation of the face and sinuses: Normal, no sinus tenderness  Eyes - Conjunctiva and lids: Conjunctiva noninjected, no eye discharge and no swelling  Pupils and irises: Equal, round, reactive to light and accommodation bilaterally; Extraocular muscles intact; Sclera anicteric  Ophthalmoscopic examination normal    Ears, Nose, Mouth, and Throat - External inspection of ears and nose: Normal without deformities or discharge; No pinna or tragal tenderness  Otoscopic examination: Tympanic membrane is pearly gray and nonbulging without discharge  Hearing: Normal  Nasal mucosa, septum, and turbinates: Normal, no edema, no nasal discharge, nares not pale or boggy  Lips, teeth, and gums: Normal, good dentition  Oropharynx: Oropharynx without ulcer, exudate or erythema, moist mucous membranes  Neck - Neck: Supple  Pulmonary - Respiratory effort: Normal respiratory rate and rhythm, no stridor, no tachypnea, grunting, flaring or retractions  Auscultation of lungs: Clear to auscultation bilaterally without wheeze, rales, or rhonchi     Cardiovascular - Auscultation of heart: Regular rate and rhythm, no murmur  Femoral pulses: Normal, 2+ bilaterally  Examination of extremities for edema and/or varicosities: Normal    Chest - Flex 3  Abdomen - Abdomen: Normal bowel sounds, soft, nondistended, nontender, no organomegaly  Liver and spleen: No hepatomegaly or splenomegaly  Examination for hernias: No hernias palpated  Genitourinary - External genitalia: Normal external female genitalia  Flex 3  Lymphatic - Palpation of lymph nodes in neck: No anterior or posterior cervical lymphadenopathy  Palpation of lymph nodes in groin: No lymphadenopathy  Musculoskeletal - Gait and station: Normal gait  Digits and nails: Capillary Refill < 2 sec, no petechie or purpura  Inspection/palpation of joints, bones, and muscles: No joint swelling, warm and well perfused  Evaluation for scoliosis: No scoliosis on exam  Full range of motion in all extremities  Stability: No joint instability  Muscle strength/tone: No hypertonia or hypotonia  Skin - Examination of the skin for lesions: Skin and subcutaneous tissue: No rash , no bruising, no pallor, cyanosis, or icterus  Hyperpigmented, velvety are in neck folds  Neurologic - Cortical function: Normal  Grossly intact  Psychiatric - Orientation to person, place, and time: Alert and oriented  Mood and affect: Normal       Results/Data  Pediatric Blood Pressure [id number] 10:05AM User, Ahs     Test Name Result Flag Reference   Pediatric Blood Pressure - Systolic Percentile < 47GH     Sex: Female  Age: 7  Height Percentile: 79KY  Systolic Blood Pressure: 88  Diastolic Blood Pressure: 48   Pediatric Blood Pressure - Diastolic Percentile < 88EV     Sex: Female  Age: 9  Height Percentile: 80XP  Systolic Blood Pressure: 88  Diastolic Blood Pressure: 48       Procedure    Procedure: Visual Acuity Test    Indication: routine screening  Inforrmation supplied by a Snellen chart     Results: 20/25 in both eyes with corrective device normal in both eyes  Glasses     Procedure: Hearing Acuity Test    Indication: Routine screeing  Audiometry: Normal bilaterally  Hearing in the right ear: 30 decibals at 500 hertz, 25 decibals at 1000 hertz, 25 decibals at 2000 hertz and 25 decibals at 4000 hertz  Hearing in the left ear: 30 decibals at 500 hertz, 25 decibals at 1000 hertz, 25 decibals at 2000 hertz and 25 decibals at 4000 hertz  Assessment    1  Well child visit (V20 2) (Z00 129)   2  Obese (278 00) (E66 9)   3  Acanthosis (701 2) (L83)   4  Snoring (786 09) (R06 83)    Plan  Acanthosis, Obese    · (1) CBC/PLT/DIFF; Status:Active; Requested for:13Apr2016;    Perform:[facility]; Due:13Apr2017;Ordered; For:Acanthosis, Obese; Ordered By:Roselyn Salter;   · (1) COMPREHENSIVE METABOLIC PANEL; Status:Active; Requested for:13Apr2016;    Perform:[facility]; Due:13Apr2017;Ordered; For:Acanthosis, Obese; Ordered By:Roselyn Salter;   · (1) HEMOGLOBIN A1C; Status:Active; Requested for:13Apr2016;    Perform:[facility]; Due:13Apr2017;Ordered; For:Acanthosis, Obese; Ordered By:Roselyn Salter;   · (1) INSULIN; Status:Active; Requested for:13Apr2016;    Perform:[facility]; Due:13Apr2017;Ordered; For:Acanthosis, Obese; Ordered By:Roselyn Salter;   · (1) LIPID PANEL, FASTING; Status:Active; Requested for:13Apr2016;    Perform:[facility]; Due:13Apr2017;Ordered; For:Acanthosis, Obese; Ordered By:Roselyn Salter;   · (1) TSH WITH FT4 REFLEX; Status:Active; Requested for:13Apr2016;    Perform:[facility]; Due:13Apr2017;Ordered; For:Acanthosis, Obese; Ordered By:Roselyn Salter;   · 1 SL NUTRITION COUNSELING [place] OUTPATIENT REFERRAL MNT Physician  Referral  Consult  Status: Need Information - Financial Authorization   Requested for: 13Apr2016   Ordered;  For: Acanthosis, Obese; Ordered By: Yokasta Bo Performed:  Due: 42OTC6226  Care Summary provided  : Yes  Health Maintenance    · Always use a seat belt and shoulder strap when riding or driving a motor vehicle ;  Status:Complete;   Done: 05VEW9224   Ordered;  For:Health Maintenance; Ordered By:Roselyn Salter;   · Brush your child's teeth after every meal and before bedtime ; Status:Complete;   Done:  16JML9725   Ordered;  For:Health Maintenance; Ordered By:Roselyn Salter;   · Keep your child away from cigarette smoke ; Status:Complete;   Done: 82OIP8700   Ordered;  For:Health Maintenance; Ordered By:Roselyn Salter;   · To prevent head injury, wear a helmet for any activity where you could be struck on the  head or fall on your head ; Status:Complete;   Done: 23QDG6722   Ordered;  For:Health Maintenance; Ordered By:Roselyn Salter;   · We recommend routine visits to a dentist ; Status:Complete;   Done: 95Hti1358   Ordered;  For:Health Maintenance; Ordered By:Roselyn Salter;   · Your child needs to eat a well-balanced diet ; Status:Complete;   Done: 85MBQ6237   Ordered;  For:Health Maintenance; Ordered By:Roselyn Salter;   · Stop: Influenza   For: Health Maintenance; Ordered By:Roselyn Salter; Effective Date:13Apr2016  Snoring    · *Polysomnography, Sleep Study, Diagnostic; Status:Need Information - Financial  Authorization; Requested for:13Apr2016;    Perform:[facility]; Due:13Apr2017;Ordered; For:Snoring; Ordered By:Roselyn Salter;  there any other medical conditions or medications that would explain these      symptoms? : No  is the sleep disturbance affecting the patient's ability to function? : decreased focus  History/Symptoms: : loud snoring, ?gasping  Study Only or Consult : Sleep Study and Consult and F/U with Sleep Specialist    Discussion/Summary    Impression:   No growth, development, elimination, skin and sleep concerns  no medical problems  Anticipatory guidance addressed as per the history of present illness section  Regarding avoidance of alcohol, drugs, tobacco  Safe sex She is not on any medications  Information discussed with mother  Yearly well  Call with concerns  Discussed weight loss and exercise  Refer to Nutritionist for weight management  Attending Note  Collaborating Physician Note: Collaborating Note: I did not interview and examine the patient and I agree with the Advanced Practitioner note  Signatures   Electronically signed by : ZAHRAA Park;  Apr 13 2016 12:40PM EST                       (Author)    Electronically signed by : JASPER Gibson MD; Apr 14 2016  8:22AM EST                       (Author) details…

## 2019-03-22 NOTE — H&P PST ADULT - NSICDXFAMILYHX_GEN_ALL_CORE_FT
FAMILY HISTORY:  Family history of Hashimoto thyroiditis, mother  FH: coronary artery disease, father  FH: diabetes mellitus, father  FH: hypertension, father

## 2019-03-22 NOTE — H&P PST ADULT - OTHER CARE PROVIDERS
Dr. Dwight Aguilar (cardiologist)  / Dr. Cadet (pulmonologist)  [phone] / Dr. Mcdermott (endocrinologist) [phone]

## 2019-03-23 PROBLEM — E04.1 NONTOXIC SINGLE THYROID NODULE: Chronic | Status: ACTIVE | Noted: 2019-03-22

## 2019-03-23 PROBLEM — J45.909 UNSPECIFIED ASTHMA, UNCOMPLICATED: Chronic | Status: ACTIVE | Noted: 2019-03-22

## 2019-03-25 ENCOUNTER — APPOINTMENT (OUTPATIENT)
Dept: FAMILY MEDICINE | Facility: CLINIC | Age: 44
End: 2019-03-25
Payer: COMMERCIAL

## 2019-03-25 VITALS
DIASTOLIC BLOOD PRESSURE: 80 MMHG | SYSTOLIC BLOOD PRESSURE: 124 MMHG | HEIGHT: 64 IN | TEMPERATURE: 98.2 F | HEART RATE: 90 BPM | BODY MASS INDEX: 26.98 KG/M2 | RESPIRATION RATE: 16 BRPM | WEIGHT: 158 LBS | OXYGEN SATURATION: 96 %

## 2019-03-25 DIAGNOSIS — R91.1 SOLITARY PULMONARY NODULE: ICD-10-CM

## 2019-03-25 DIAGNOSIS — R07.89 OTHER CHEST PAIN: ICD-10-CM

## 2019-03-25 DIAGNOSIS — Z01.818 ENCOUNTER FOR OTHER PREPROCEDURAL EXAMINATION: ICD-10-CM

## 2019-03-25 PROCEDURE — 99214 OFFICE O/P EST MOD 30 MIN: CPT

## 2019-03-25 RX ORDER — PREDNISONE 10 MG/1
10 TABLET ORAL DAILY
Qty: 10 | Refills: 1 | Status: DISCONTINUED | COMMUNITY
Start: 2019-01-04 | End: 2019-03-25

## 2019-03-25 NOTE — PHYSICAL EXAM
[No Acute Distress] : no acute distress [Well Nourished] : well nourished [Well Developed] : well developed [Normal Sclera/Conjunctiva] : normal sclera/conjunctiva [PERRL] : pupils equal round and reactive to light [EOMI] : extraocular movements intact [Normal Outer Ear/Nose] : the outer ears and nose were normal in appearance [Normal Oropharynx] : the oropharynx was normal [Normal TMs] : both tympanic membranes were normal [Supple] : supple [No Lymphadenopathy] : no lymphadenopathy [No Respiratory Distress] : no respiratory distress  [Clear to Auscultation] : lungs were clear to auscultation bilaterally [No Accessory Muscle Use] : no accessory muscle use [Normal Rate] : normal rate  [Regular Rhythm] : with a regular rhythm [Normal S1, S2] : normal S1 and S2 [No Murmur] : no murmur heard [No Edema] : there was no peripheral edema [Soft] : abdomen soft [Non Tender] : non-tender [Non-distended] : non-distended [Normal Bowel Sounds] : normal bowel sounds [Normal Anterior Cervical Nodes] : no anterior cervical lymphadenopathy [No Spinal Tenderness] : no spinal tenderness [Grossly Normal Strength/Tone] : grossly normal strength/tone [No Rash] : no rash [Normal Gait] : normal gait [No Focal Deficits] : no focal deficits [Normal Affect] : the affect was normal [Normal Insight/Judgement] : insight and judgment were intact

## 2019-03-28 NOTE — PLAN
[FreeTextEntry1] : Pre-op/ hilar lymphadenopathy\par - suspected sarcoidosis\par - going for EBUS\par - labs and EKG reviewed and within normal limits\par - medically optimized for procedure\par - cardiac clearance obtained\par \par Atypical chest pain\par - EKG sinus rhythm, normal\par - going for stress test on 3/28\par - results of stress test reviewed and normal \par \par Eczema\par - steroid cream \par \par Allergies\par - renew epi-pen

## 2019-03-28 NOTE — HISTORY OF PRESENT ILLNESS
[No Pertinent Cardiac History] : no history of aortic stenosis, atrial fibrillation, coronary artery disease, recent myocardial infarction, or implantable device/pacemaker [Asthma] : asthma [No Adverse Anesthesia Reaction] : no adverse anesthesia reaction in self or family member [Good (7-10 METs)] : Good (7-10 METs) [COPD] : no COPD [Sleep Apnea] : no sleep apnea [Smoker] : not a smoker [Chronic Anticoagulation] : no chronic anticoagulation [Chronic Kidney Disease] : no chronic kidney disease [Diabetes] : no diabetes [FreeTextEntry1] : EBUS [FreeTextEntry2] : 4/1/19 [FreeTextEntry3] : Dr. Olivera [FreeTextEntry4] : Patient has hilar lymphadenopathy and suspected sarcoidosis.  SHe will be having the procedure to confirm diagnosis.\par Patient complaining of chest pain intermittently.  Is going for stress test this week before surgery.  [FreeTextEntry6] : chest pain, intermittent

## 2019-03-28 NOTE — ASSESSMENT
[As per surgery] : as per surgery [Patient Optimized for Surgery] : Patient optimized for surgery [FreeTextEntry4] : Patient is here for clearance for EBUS procedure.  \par Presurgical labs and EKG reviewed - within normal limits, normal stress test\par Patient seeing cardiology, stress test completed and normal.  Cardiac clearance obtained\par Patient is medically optimized for procedure

## 2019-04-01 ENCOUNTER — RESULT REVIEW (OUTPATIENT)
Age: 44
End: 2019-04-01

## 2019-04-01 ENCOUNTER — OUTPATIENT (OUTPATIENT)
Dept: OUTPATIENT SERVICES | Facility: HOSPITAL | Age: 44
LOS: 1 days | Discharge: HOME | End: 2019-04-01
Payer: COMMERCIAL

## 2019-04-01 ENCOUNTER — APPOINTMENT (OUTPATIENT)
Age: 44
End: 2019-04-01

## 2019-04-01 VITALS
RESPIRATION RATE: 15 BRPM | HEART RATE: 83 BPM | SYSTOLIC BLOOD PRESSURE: 100 MMHG | DIASTOLIC BLOOD PRESSURE: 59 MMHG | OXYGEN SATURATION: 100 %

## 2019-04-01 VITALS
HEART RATE: 92 BPM | RESPIRATION RATE: 16 BRPM | TEMPERATURE: 98 F | DIASTOLIC BLOOD PRESSURE: 79 MMHG | WEIGHT: 156.09 LBS | SYSTOLIC BLOOD PRESSURE: 127 MMHG | OXYGEN SATURATION: 98 % | HEIGHT: 63 IN

## 2019-04-01 DIAGNOSIS — K08.409 PARTIAL LOSS OF TEETH, UNSPECIFIED CAUSE, UNSPECIFIED CLASS: Chronic | ICD-10-CM

## 2019-04-01 DIAGNOSIS — R91.1 SOLITARY PULMONARY NODULE: ICD-10-CM

## 2019-04-01 DIAGNOSIS — Z98.890 OTHER SPECIFIED POSTPROCEDURAL STATES: Chronic | ICD-10-CM

## 2019-04-01 LAB — HCG UR QL: NEGATIVE — SIGNIFICANT CHANGE UP

## 2019-04-01 PROCEDURE — 88305 TISSUE EXAM BY PATHOLOGIST: CPT | Mod: 26

## 2019-04-01 PROCEDURE — 88172 CYTP DX EVAL FNA 1ST EA SITE: CPT | Mod: 26

## 2019-04-01 PROCEDURE — 88173 CYTOPATH EVAL FNA REPORT: CPT | Mod: 26

## 2019-04-01 PROCEDURE — 31652 BRONCH EBUS SAMPLNG 1/2 NODE: CPT

## 2019-04-01 PROCEDURE — 88312 SPECIAL STAINS GROUP 1: CPT | Mod: 26

## 2019-04-01 RX ORDER — HYDROMORPHONE HYDROCHLORIDE 2 MG/ML
0.5 INJECTION INTRAMUSCULAR; INTRAVENOUS; SUBCUTANEOUS
Qty: 0 | Refills: 0 | Status: DISCONTINUED | OUTPATIENT
Start: 2019-04-01 | End: 2019-04-01

## 2019-04-01 RX ORDER — CETIRIZINE HYDROCHLORIDE 10 MG/1
10 TABLET ORAL DAILY
Qty: 0 | Refills: 0 | Status: DISCONTINUED | OUTPATIENT
Start: 2019-04-01 | End: 2019-04-01

## 2019-04-01 RX ORDER — LORATADINE 10 MG/1
10 TABLET ORAL DAILY
Qty: 0 | Refills: 0 | Status: DISCONTINUED | OUTPATIENT
Start: 2019-04-01 | End: 2019-04-16

## 2019-04-01 RX ORDER — ONDANSETRON 8 MG/1
4 TABLET, FILM COATED ORAL ONCE
Qty: 0 | Refills: 0 | Status: DISCONTINUED | OUTPATIENT
Start: 2019-04-01 | End: 2019-04-01

## 2019-04-01 NOTE — BRIEF OPERATIVE NOTE - NSICDXBRIEFPROCEDURE_GEN_ALL_CORE_FT
PROCEDURES:  EBUS, with fine needle aspiration 01-Apr-2019 08:51:11  Richard Vee  Bronchoscopy, flexible, adult 01-Apr-2019 08:50:53  Richard Vee

## 2019-04-01 NOTE — ASU PATIENT PROFILE, ADULT - PMH
Asthma  last eval with pulmonologist in 3/2019  Gestational diabetes    Hyperlipidemia    Thyroid nodule  last evaluation in 2017

## 2019-04-01 NOTE — ASU DISCHARGE PLAN (ADULT/PEDIATRIC) - CARE PROVIDER_API CALL
Gareth Cadet)  Critical Care Medicine; Internal Medicine; Pulmonary Disease  [address], [address]  [place] [address]  Phone: [phone]  Fax: [phone]  Follow Up Time: Gareth Cadte)  Critical Care Medicine; Internal Medicine; Pulmonary Disease  [address], [address]  [place] [address]  Phone: [phone]  Fax: [phone]  Follow Up Time:

## 2019-04-02 LAB — NON-GYNECOLOGICAL CYTOLOGY STUDY: SIGNIFICANT CHANGE UP

## 2019-04-12 ENCOUNTER — APPOINTMENT (OUTPATIENT)
Dept: ENDOCRINOLOGY | Facility: CLINIC | Age: 44
End: 2019-04-12

## 2019-05-01 NOTE — PHYSICAL EXAM
[General Appearance - Well Developed] : well developed [Normal Appearance] : normal appearance [Well Groomed] : well groomed [General Appearance - Well Nourished] : well nourished [General Appearance - In No Acute Distress] : no acute distress [No Deformities] : no deformities [II] : II [Normal Conjunctiva] : the conjunctiva exhibited no abnormalities [Eyelids - No Xanthelasma] : the eyelids demonstrated no xanthelasmas [Neck Cervical Mass (___cm)] : no neck mass was observed [Neck Appearance] : the appearance of the neck was normal [Thyroid Diffuse Enlargement] : the thyroid was not enlarged [Jugular Venous Distention Increased] : there was no jugular-venous distention [Heart Sounds] : normal S1 and S2 [Heart Rate And Rhythm] : heart rate and rhythm were normal [Thyroid Nodule] : there were no palpable thyroid nodules [Murmurs] : no murmurs present [Respiration, Rhythm And Depth] : normal respiratory rhythm and effort [Exaggerated Use Of Accessory Muscles For Inspiration] : no accessory muscle use [Auscultation Breath Sounds / Voice Sounds] : lungs were clear to auscultation bilaterally [Abdomen Soft] : soft [Tenderness: ____] : tenderness [unfilled] [Abdomen Tenderness] : non-tender [Abdomen Mass (___ Cm)] : no abdominal mass palpated [Abnormal Walk] : normal gait [Gait - Sufficient For Exercise Testing] : the gait was sufficient for exercise testing [Nail Clubbing] : no clubbing of the fingernails [Cyanosis, Localized] : no localized cyanosis [Petechial Hemorrhages (___cm)] : no petechial hemorrhages [Sensation] : the sensory exam was normal to light touch and pinprick [Deep Tendon Reflexes (DTR)] : deep tendon reflexes were 2+ and symmetric [No Focal Deficits] : no focal deficits [Impaired Insight] : insight and judgment were intact [Oriented To Time, Place, And Person] : oriented to person, place, and time [Affect] : the affect was normal [Skin Color & Pigmentation] : normal skin color and pigmentation [Skin Turgor] : normal skin turgor [] : no rash

## 2019-05-03 ENCOUNTER — APPOINTMENT (OUTPATIENT)
Dept: PULMONOLOGY | Facility: CLINIC | Age: 44
End: 2019-05-03
Payer: COMMERCIAL

## 2019-05-03 VITALS
TEMPERATURE: 97.5 F | HEART RATE: 81 BPM | RESPIRATION RATE: 16 BRPM | DIASTOLIC BLOOD PRESSURE: 76 MMHG | SYSTOLIC BLOOD PRESSURE: 111 MMHG | BODY MASS INDEX: 27.26 KG/M2 | WEIGHT: 158.8 LBS | OXYGEN SATURATION: 98 %

## 2019-05-03 DIAGNOSIS — E04.1 NONTOXIC SINGLE THYROID NODULE: ICD-10-CM

## 2019-05-03 PROBLEM — E78.5 HYPERLIPIDEMIA, UNSPECIFIED: Chronic | Status: ACTIVE | Noted: 2019-03-22

## 2019-05-03 PROBLEM — O24.419 GESTATIONAL DIABETES MELLITUS IN PREGNANCY, UNSPECIFIED CONTROL: Chronic | Status: ACTIVE | Noted: 2019-03-22

## 2019-05-03 LAB
ALBUMIN SERPL ELPH-MCNC: 4.3 G/DL
ALP BLD-CCNC: 60 U/L
ALT SERPL-CCNC: 17 U/L
ANION GAP SERPL CALC-SCNC: 13 MMOL/L
AST SERPL-CCNC: 25 U/L
BASOPHILS # BLD AUTO: 0.02 K/UL
BASOPHILS NFR BLD AUTO: 0.6 %
BILIRUB SERPL-MCNC: 1.4 MG/DL
BUN SERPL-MCNC: 13 MG/DL
CALCIUM SERPL-MCNC: 9.7 MG/DL
CHLORIDE SERPL-SCNC: 104 MMOL/L
CO2 SERPL-SCNC: 26 MMOL/L
CREAT SERPL-MCNC: 0.87 MG/DL
EOSINOPHIL # BLD AUTO: 0.16 K/UL
EOSINOPHIL NFR BLD AUTO: 4.8 %
FSH SERPL-MCNC: 37.5 IU/L
GLUCOSE SERPL-MCNC: 77 MG/DL
HCG SERPL-MCNC: <1 MIU/ML
HCT VFR BLD CALC: 41.2 %
HGB BLD-MCNC: 14 G/DL
IMM GRANULOCYTES NFR BLD AUTO: 0.3 %
LH SERPL-ACNC: 32.2 IU/L
LYMPHOCYTES # BLD AUTO: 0.94 K/UL
LYMPHOCYTES NFR BLD AUTO: 28.4 %
MAN DIFF?: NORMAL
MCHC RBC-ENTMCNC: 31.5 PG
MCHC RBC-ENTMCNC: 34 GM/DL
MCV RBC AUTO: 92.6 FL
MONOCYTES # BLD AUTO: 0.49 K/UL
MONOCYTES NFR BLD AUTO: 14.8 %
NEUTROPHILS # BLD AUTO: 1.69 K/UL
NEUTROPHILS NFR BLD AUTO: 51.1 %
PLATELET # BLD AUTO: 222 K/UL
POTASSIUM SERPL-SCNC: 4.1 MMOL/L
PROT SERPL-MCNC: 7 G/DL
RBC # BLD: 4.45 M/UL
RBC # FLD: 12.2 %
SODIUM SERPL-SCNC: 143 MMOL/L
TSH SERPL-ACNC: 1.86 UIU/ML
WBC # FLD AUTO: 3.31 K/UL

## 2019-05-03 PROCEDURE — 99215 OFFICE O/P EST HI 40 MIN: CPT | Mod: 25

## 2019-05-03 PROCEDURE — 36415 COLL VENOUS BLD VENIPUNCTURE: CPT

## 2019-05-03 NOTE — HISTORY OF PRESENT ILLNESS
[FreeTextEntry1] : This letter  is regarding your patient  who  attended pulmonary out patient office today.  I have reviewed  patient's  past history, social history, family history and medication list. I also  reviewed nurse practitioners/ and fellows  notes and assessment and agree with it.  \par The patient was referred by  for  sarcoidosis, abn CT/PET. -on cardiac mri 2109 aslo seems to have features of cardiac sarcoidosis---also has splenic lesions -----\par Ebus 2019 --non caseating gramulomas   \par Being w/u by heme for splenic lesions/splenomegaly.---has followed  dr carmen mcdonough in the past at [facility] [till 2017] -----------FORMER SMOKER---\par \par Presented 2015 w/chest pain- CTA ruled out PE but incidental finding was lymphadenopathy. 2016- CT noted splenic lesions. Recent imaging shows lung nodule and increasing splenic lesions\par former smoker, quit at age 24, 9 pack year history. Works as sonographer\par \par pmh of asthma- controlled w/adviar, albuterol prn, daily zyrtec and nasonex\par \par ------No history of , fever, chills , rigors, chest pain, or hemoptysis.  No h/o significant weight loss in last few months. No history of liver dysfunction , collagen vascular disorder or chronic thromboembolic disease. I would classify the patient's dyspnea as WHO  FUNCTIONAL CLASS II--------\par \par ----Echo  date-----cardiology-\par ----Pft date---2019 mild obstruction\par \par -PET/CT FGD avid left lung nodule at apex, mediastinal and hilar lymphadenopthy, numerous splenic lesions------[done at an outside facility ]\par - CT CHEST 12/2018- MEDISATINAL AND BILATERAL  HILAR LN, WITH PULMONARY NODULES---THERE HAS BEEN AN INTERVAL DEVELOPMENT OF  PARENCHYMAL DISEASE \par -U/S GUIDED LN BIOPSY ----- 12/2018---REPORTED AS  GRANULOMATOUS LYMPHADENITIS WITHOUT NECROSIS\par \par cytopath 4/1/19 : non necrotizing granulomatous inflammation\par \par cardiac MRI 2019 - ??? past h/o  myocarditis\par \par may 2019 s/p lung biopsy - no current resp complaints. Was having chest pain/palipations- s/p stress test- normal

## 2019-05-03 NOTE — DISCUSSION/SUMMARY
[Teratogenicity] : the risk of teratogenicity of the prescribed medication [Contraception Use] : use of double barrier method of contraception [Liver Toxicity] : the risk of liver toxicity and the need for monthly LFT monitoring [Eye Exam] : the risk of NAION and the need for annual eye exam [FreeTextEntry1] : ---Assessment plan----------The patient has been referred here for further opinion regarding pulmonary problem, 45 yo with CLINICAL AND RADIOLOGICAL FEATURES SUGGESTIVE OF SARCOIDOSIS  sarcoidosis, abn CT/PET.[  mediastinal and hilar  adenopathy  Not symptomatic.]\par Being w/u by heme for splenic lesions/splenomegaly.,\par \par 1 sarcoidosis-CARDIAC SARCOIDOSIS , SPLENIC INVOLVEMENT -----will start MTX 2.5 mg weekly x 1 month then uptitrate 5mg weekly- reviewed benefits/risks, side effects- drug ed given to pt. Labs drawn today-then monthly \par \par 2- chest pain- CARDIAC SARCOIDOSIS ON MRI--- s/p ekg w/conduction delay-  consult w/Dr Scherer- get echo ----WILL D/W GRAVER REGARDING EPS EVALUATION FOR SARCOIDOSIS\par 3-needs yearly eye exam\par 4- needs CPET -----to evaluate exercise tolerance\par \par Thanks for allowing  me to participate  in the care of this patient.  Patient at this time  will follow  the above mentioned recommendations and return back for follow up visit. If you have any questions  I can be reached  at #[phone] (beeper)  or  [phone] (office).\par \par Gareth Cadet MD, FCCP \par Director, Pulmonary Hypertension Program \par [facility] \par Division of Pulmonary, Critical Care and Sleep Medicine \par  Professor of Medicine \par [employer]\par

## 2019-05-07 LAB — ACE BLD-CCNC: 91 U/L

## 2019-06-03 ENCOUNTER — APPOINTMENT (OUTPATIENT)
Dept: FAMILY MEDICINE | Facility: CLINIC | Age: 44
End: 2019-06-03
Payer: COMMERCIAL

## 2019-06-03 VITALS
TEMPERATURE: 98.3 F | HEART RATE: 81 BPM | SYSTOLIC BLOOD PRESSURE: 114 MMHG | HEIGHT: 64 IN | BODY MASS INDEX: 26.98 KG/M2 | RESPIRATION RATE: 16 BRPM | DIASTOLIC BLOOD PRESSURE: 76 MMHG | WEIGHT: 158 LBS | OXYGEN SATURATION: 97 %

## 2019-06-03 DIAGNOSIS — M25.521 PAIN IN RIGHT ELBOW: ICD-10-CM

## 2019-06-03 PROCEDURE — 99213 OFFICE O/P EST LOW 20 MIN: CPT

## 2019-06-03 NOTE — HISTORY OF PRESENT ILLNESS
[FreeTextEntry8] : Patient is here today for an acute visit for right sided elbow pain.\par She has the pain all around the whole elbow and it has been there for about 2-3 weeks. \par Her pain is intermittent and the scale ranges between minimal to average of 5/10.  The pain can be as high as 8/10.\par She does not have any radiation of pain. \par She feels the pain is worse with any movement of her arm.  Advil does help to relieve the pain.\par No trauma to the area.\par

## 2019-06-03 NOTE — ASSESSMENT
[FreeTextEntry1] : RIght elbow pain\par - likely tendinitis\par - use heating pad as needed\par - start medrol pack\par - if not improving may start physical therapy

## 2019-06-03 NOTE — PHYSICAL EXAM
[No Acute Distress] : no acute distress [Well Nourished] : well nourished [Well Developed] : well developed [Normal Sclera/Conjunctiva] : normal sclera/conjunctiva [PERRL] : pupils equal round and reactive to light [EOMI] : extraocular movements intact [Normal Outer Ear/Nose] : the outer ears and nose were normal in appearance [Normal Oropharynx] : the oropharynx was normal [Normal TMs] : both tympanic membranes were normal [Supple] : supple [No Lymphadenopathy] : no lymphadenopathy [No Respiratory Distress] : no respiratory distress  [Clear to Auscultation] : lungs were clear to auscultation bilaterally [No Accessory Muscle Use] : no accessory muscle use [Normal Rate] : normal rate  [Regular Rhythm] : with a regular rhythm [Normal S1, S2] : normal S1 and S2 [No Edema] : there was no peripheral edema [Normal Anterior Cervical Nodes] : no anterior cervical lymphadenopathy [No Spinal Tenderness] : no spinal tenderness [Grossly Normal Strength/Tone] : grossly normal strength/tone [No Rash] : no rash [Normal Gait] : normal gait [Normal Affect] : the affect was normal [Normal Insight/Judgement] : insight and judgment were intact [de-identified] : good ROM, no point tenderness

## 2019-06-06 ENCOUNTER — APPOINTMENT (OUTPATIENT)
Dept: CARDIOLOGY | Facility: CLINIC | Age: 44
End: 2019-06-06

## 2019-06-17 ENCOUNTER — APPOINTMENT (OUTPATIENT)
Dept: PULMONOLOGY | Facility: CLINIC | Age: 44
End: 2019-06-17
Payer: COMMERCIAL

## 2019-06-17 PROCEDURE — 94621 CARDIOPULM EXERCISE TESTING: CPT

## 2019-06-17 PROCEDURE — 94375 RESPIRATORY FLOW VOLUME LOOP: CPT

## 2019-07-02 ENCOUNTER — NON-APPOINTMENT (OUTPATIENT)
Age: 44
End: 2019-07-02

## 2019-07-02 ENCOUNTER — APPOINTMENT (OUTPATIENT)
Dept: CARDIOLOGY | Facility: CLINIC | Age: 44
End: 2019-07-02
Payer: COMMERCIAL

## 2019-07-02 VITALS
HEIGHT: 64 IN | HEART RATE: 74 BPM | OXYGEN SATURATION: 96 % | DIASTOLIC BLOOD PRESSURE: 78 MMHG | WEIGHT: 162 LBS | SYSTOLIC BLOOD PRESSURE: 124 MMHG | BODY MASS INDEX: 27.66 KG/M2

## 2019-07-02 PROCEDURE — 93000 ELECTROCARDIOGRAM COMPLETE: CPT

## 2019-07-02 PROCEDURE — 99205 OFFICE O/P NEW HI 60 MIN: CPT

## 2019-07-02 NOTE — DISCUSSION/SUMMARY
[FreeTextEntry1] : 44 year woman with a history as listed presents for an initial cardiac evaluation. 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 I think that the best thing would be to do a   cardiac PET scan to better delineate her cardiac involvement. If there is definitive cardiac involvement this may change her treatment course as many of these patients are managed with steroids. \par She needs to be monitored for conduction abnormalities. I would start with a 30 day event monitor. If her event monitor is negative I may consider a ILR. If there is a significant PVC burden or NSVT she will need to see EP. \par She recently had a stress test at her former cardiologists office for which she will get the results of. \par Exercise and diet counseling was performed in order to reduce her future cardiovascular risk.\par She will followup with me in 2-3 weeks or sooner if necessary.

## 2019-07-02 NOTE — HISTORY OF PRESENT ILLNESS
[FreeTextEntry1] : 44 F with past medical history of Sarcoid with cardiac involvement seen on a cardiac MR, normal LV function, family history of early CAD presents for an initial cardiac evaluation. 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 She had a cardiac MR in 4/2019 showing questionable subtle subepicardial enhancement along the LV basal to mid inferior wall with adjacent small pericardial effusion favoring a sequalla of prior mild myocarditis although cannot exclude subtle involvement of sarcoidosis. \par \par \par She has been complaining of  intermittent chest pain that is non radiating, achy, self limiting, lasting about 10 mins, nonexertional, not occurring in specific place, occurring on average every other week. These symptoms have been chronic and unchanged.  She   denies any chest pain, PND, orthopnea, lower extremity edema, near syncope, syncope, strokelike symptoms. She is complaining of dyspnea on exertion especially with climbing a flight of stairs. She denies any palpitations.

## 2019-07-02 NOTE — PHYSICAL EXAM
[Well Groomed] : well groomed [General Appearance - In No Acute Distress] : no acute distress [Normal Conjunctiva] : the conjunctiva exhibited no abnormalities [Eyelids - No Xanthelasma] : the eyelids demonstrated no xanthelasmas [Normal Oral Mucosa] : normal oral mucosa [Normal Jugular Venous A Waves Present] : normal jugular venous A waves present [No Oral Cyanosis] : no oral cyanosis [No Oral Pallor] : no oral pallor [No Jugular Venous Beavers A Waves] : no jugular venous beavers A waves [Normal Jugular Venous V Waves Present] : normal jugular venous V waves present [Normal Rate] : normal [Rhythm Regular] : regular [Normal S1] : normal S1 [No Murmur] : no murmurs heard [Normal S2] : normal S2 [2+] : right 2+ [No Pitting Edema] : no pitting edema present [Respiration, Rhythm And Depth] : normal respiratory rhythm and effort [Exaggerated Use Of Accessory Muscles For Inspiration] : no accessory muscle use [Auscultation Breath Sounds / Voice Sounds] : lungs were clear to auscultation bilaterally [Abdomen Soft] : soft [Abdomen Tenderness] : non-tender [Abdomen Mass (___ Cm)] : no abdominal mass palpated [Abnormal Walk] : normal gait [Gait - Sufficient For Exercise Testing] : the gait was sufficient for exercise testing [Nail Clubbing] : no clubbing of the fingernails [Cyanosis, Localized] : no localized cyanosis [Skin Color & Pigmentation] : normal skin color and pigmentation [Petechial Hemorrhages (___cm)] : no petechial hemorrhages [Skin Lesions] : no skin lesions [No Venous Stasis] : no venous stasis [] : no rash [Oriented To Time, Place, And Person] : oriented to person, place, and time [No Skin Ulcers] : no skin ulcer [No Xanthoma] : no  xanthoma was observed [No Anxiety] : not feeling anxious [Affect] : the affect was normal [Mood] : the mood was normal [Right Carotid Bruit] : no bruit heard over the right carotid [Left Carotid Bruit] : no bruit heard over the left carotid [Bruit] : no bruit heard

## 2019-07-15 ENCOUNTER — APPOINTMENT (OUTPATIENT)
Dept: CARDIOLOGY | Facility: CLINIC | Age: 44
End: 2019-07-15
Payer: COMMERCIAL

## 2019-07-15 ENCOUNTER — NON-APPOINTMENT (OUTPATIENT)
Age: 44
End: 2019-07-15

## 2019-07-15 VITALS
DIASTOLIC BLOOD PRESSURE: 73 MMHG | BODY MASS INDEX: 27.83 KG/M2 | WEIGHT: 163 LBS | HEIGHT: 64 IN | OXYGEN SATURATION: 95 % | SYSTOLIC BLOOD PRESSURE: 112 MMHG | HEART RATE: 68 BPM

## 2019-07-15 PROCEDURE — 93000 ELECTROCARDIOGRAM COMPLETE: CPT

## 2019-07-15 PROCEDURE — 99215 OFFICE O/P EST HI 40 MIN: CPT

## 2019-07-16 ENCOUNTER — NON-APPOINTMENT (OUTPATIENT)
Age: 44
End: 2019-07-16

## 2019-07-16 NOTE — HISTORY OF PRESENT ILLNESS
[FreeTextEntry1] : 44 F with past medical history of Sarcoid with cardiac involvement seen on a cardiac MR, normal LV function, family history of early CAD.\par She has been diagnosed with pulmonary sarcoid with noted numerous splenic lesion noted of CT/PET. She had a cardiac MR in 4/2019 showing questionable subtle subepicardial enhancement along the LV basal to mid inferior wall with adjacent small pericardial effusion favoring a sequalla of prior mild myocarditis although cannot exclude subtle involvement of sarcoidosis. \par \par She is here for a followup visit. \par She has not been able to tget the event monitor yet. \par She still has complaining of  intermittent chest pain that is non radiating, achy, self limiting, lasting about 10 mins, nonexertional, not occurring in specific place, occurring on average every other week. These symptoms have been chronic and unchanged. \par  She   denies any  PND, orthopnea, lower extremity edema, near syncope, syncope, strokelike symptoms. She is complaining of dyspnea on exertion especially with climbing a flight of stairs. She denies any palpitations. \par She is compliant with her medications.

## 2019-07-16 NOTE — DISCUSSION/SUMMARY
[FreeTextEntry1] : 44 year woman with a history as listed presents for a followup visit. 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 I think that the best thing would be to do a   cardiac PET scan to better delineate her cardiac involvement. If there is definitive cardiac involvement this may change her treatment course as many of these patients are managed with steroids. I will refer her to Dr. Travis for assistance in further managing her cardiac sequela of sarcoid.  \par She needs to be monitored for conduction abnormalities. I would start with a 30 day event monitor. If her event monitor is negative I may consider a ILR. If there is a significant PVC burden or NSVT she will need to see EP. \par She had an exercise stress test unrevealing for ischemia on 3/28/19. She did 9 minutes on avelina protocol which resulted in Good exercise tolerance. Therefore, her EUGENE is not as concerning and maybe related to her underlying lung issues or deconditioning. \par Exercise and diet counseling was performed in order to reduce her future cardiovascular risk.\par She will followup with me in 2-3 months or sooner if necessary.

## 2019-07-16 NOTE — PHYSICAL EXAM
[Well Groomed] : well groomed [General Appearance - In No Acute Distress] : no acute distress [Normal Conjunctiva] : the conjunctiva exhibited no abnormalities [Eyelids - No Xanthelasma] : the eyelids demonstrated no xanthelasmas [Normal Oral Mucosa] : normal oral mucosa [No Oral Pallor] : no oral pallor [No Oral Cyanosis] : no oral cyanosis [Normal Jugular Venous A Waves Present] : normal jugular venous A waves present [Normal Jugular Venous V Waves Present] : normal jugular venous V waves present [No Jugular Venous Beavers A Waves] : no jugular venous beavers A waves [Respiration, Rhythm And Depth] : normal respiratory rhythm and effort [Exaggerated Use Of Accessory Muscles For Inspiration] : no accessory muscle use [Auscultation Breath Sounds / Voice Sounds] : lungs were clear to auscultation bilaterally [Abdomen Soft] : soft [Abdomen Tenderness] : non-tender [Abdomen Mass (___ Cm)] : no abdominal mass palpated [Abnormal Walk] : normal gait [Gait - Sufficient For Exercise Testing] : the gait was sufficient for exercise testing [Nail Clubbing] : no clubbing of the fingernails [Cyanosis, Localized] : no localized cyanosis [Petechial Hemorrhages (___cm)] : no petechial hemorrhages [Skin Color & Pigmentation] : normal skin color and pigmentation [] : no rash [No Venous Stasis] : no venous stasis [Skin Lesions] : no skin lesions [No Skin Ulcers] : no skin ulcer [No Xanthoma] : no  xanthoma was observed [Oriented To Time, Place, And Person] : oriented to person, place, and time [Affect] : the affect was normal [Mood] : the mood was normal [No Anxiety] : not feeling anxious [Normal Rate] : normal [Rhythm Regular] : regular [Normal S1] : normal S1 [Normal S2] : normal S2 [No Murmur] : no murmurs heard [2+] : left 2+ [No Pitting Edema] : no pitting edema present [Right Carotid Bruit] : no bruit heard over the right carotid [Left Carotid Bruit] : no bruit heard over the left carotid [Bruit] : no bruit heard

## 2019-07-19 ENCOUNTER — MEDICATION RENEWAL (OUTPATIENT)
Age: 44
End: 2019-07-19

## 2019-07-22 ENCOUNTER — TRANSCRIPTION ENCOUNTER (OUTPATIENT)
Age: 44
End: 2019-07-22

## 2019-07-30 ENCOUNTER — TRANSCRIPTION ENCOUNTER (OUTPATIENT)
Age: 44
End: 2019-07-30

## 2019-07-31 ENCOUNTER — APPOINTMENT (OUTPATIENT)
Dept: CARDIOLOGY | Facility: CLINIC | Age: 44
End: 2019-07-31
Payer: COMMERCIAL

## 2019-07-31 VITALS
SYSTOLIC BLOOD PRESSURE: 117 MMHG | WEIGHT: 159 LBS | HEART RATE: 96 BPM | HEIGHT: 64 IN | BODY MASS INDEX: 27.14 KG/M2 | OXYGEN SATURATION: 95 % | DIASTOLIC BLOOD PRESSURE: 74 MMHG

## 2019-07-31 PROCEDURE — 99205 OFFICE O/P NEW HI 60 MIN: CPT

## 2019-07-31 RX ORDER — UBIDECARENONE/VIT E ACET 100MG-5
50 MCG CAPSULE ORAL
Refills: 0 | Status: ACTIVE | COMMUNITY

## 2019-07-31 RX ORDER — METHYLPREDNISOLONE 4 MG/1
4 TABLET ORAL
Qty: 1 | Refills: 0 | Status: DISCONTINUED | COMMUNITY
Start: 2019-06-03 | End: 2019-07-31

## 2019-08-01 ENCOUNTER — RX RENEWAL (OUTPATIENT)
Age: 44
End: 2019-08-01

## 2019-08-01 ENCOUNTER — TRANSCRIPTION ENCOUNTER (OUTPATIENT)
Age: 44
End: 2019-08-01

## 2019-08-02 NOTE — ASSESSMENT
[FreeTextEntry1] : Ms. Ladd a 44 year old woman with diffuse sarcoidosis, primarily involving the lungs with some concern for pericardial or myocardial involvement. She has no history of arrhythmias and no history of congestive heart failure.

## 2019-08-02 NOTE — PHYSICAL EXAM
[General Appearance - Well Developed] : well developed [General Appearance - Well Nourished] : well nourished [Normal Oral Mucosa] : normal oral mucosa [Normal Conjunctiva] : the conjunctiva exhibited no abnormalities [Normal Oropharynx] : normal oropharynx [Heart Rate And Rhythm] : heart rate and rhythm were normal [Arterial Pulses Normal] : the arterial pulses were normal [Heart Sounds] : normal S1 and S2 [Edema] : no peripheral edema present [Respiration, Rhythm And Depth] : normal respiratory rhythm and effort [Auscultation Breath Sounds / Voice Sounds] : lungs were clear to auscultation bilaterally [Bowel Sounds] : normal bowel sounds [Abdomen Soft] : soft [Abnormal Walk] : normal gait [] : no hepato-splenomegaly [Nail Clubbing] : no clubbing of the fingernails [Skin Color & Pigmentation] : normal skin color and pigmentation [Skin Turgor] : normal skin turgor [Oriented To Time, Place, And Person] : oriented to person, place, and time [Impaired Insight] : insight and judgment were intact [No Anxiety] : not feeling anxious [FreeTextEntry1] : Normal JVP without HJR.

## 2019-08-02 NOTE — HISTORY OF PRESENT ILLNESS
[FreeTextEntry1] : Ms. Alayna Yanez is a 44 year old woman with a known history of sarcoidosis with primary lung involvement. She has had a chronic pericardial effusion without hemodynamic significance. She has cardiac MRI findings as described below, with concern for an old focus of inflammation. \par \par She currently denies any shortness of breath on typical exertion, but notes some dyspnea with more active exertion. She denies syncope, palpitations, or dizziness. She has no PND or orthopnea. She denies cough. She has no abdominal pain, change in vision, or headaches. \par \par I reviewed the following studies:\par \par Holter monitors showing sinus rhythm and sinus arrhythmia without ventricular tachycardia or NSVT. \par \par EKG from July 15th, 2019, showing normal sinus rhythm. There was no clear conduction abnormality, but there was a RVR in V1 (present since at least 2014).\par \par Echo from 5/9/2019, showing normal LV cavity size with normal wall thickness. There was normal LV systolic function with normal LVEF. There was normal diastolic function. The RV size and function were normal. The atria were normal in size. There was mild to moderate mitral regurgitation. There was a small, mostly anterior pericardial effusion. PASP was normal. \par \par PFTs from June 17, 2019, showing an obstructive ventilatory defect with low ventilatory reserve. \par \par PET-CT (skull base to mid thigh) from March 9th, 2019, showing left lung apex focal activity and increase in hilar mediastinal lymphadenopathy. There was possible involvement in the abdomen and pelvis. \par \par Cardiac MRI from 4/24/2019, showing a questionable subtle subepicardial enhancement along the basal to mid inferior wall with subjacent small pericardial fluid. The interpretations favored sequela of prior mild myocarditis.

## 2019-08-02 NOTE — DISCUSSION/SUMMARY
[FreeTextEntry1] : - I will order a dedicated cardiac PET-CT to rule out cardiac sarcoid. \par - Follow-up pending results of the study. \par - I have encouraged her to follow-up with Dr. Cadet's recommendations for immunosuppression in the meantime.

## 2019-08-07 ENCOUNTER — MEDICATION RENEWAL (OUTPATIENT)
Age: 44
End: 2019-08-07

## 2019-08-12 ENCOUNTER — MEDICATION RENEWAL (OUTPATIENT)
Age: 44
End: 2019-08-12

## 2019-09-09 ENCOUNTER — TRANSCRIPTION ENCOUNTER (OUTPATIENT)
Age: 44
End: 2019-09-09

## 2019-09-10 ENCOUNTER — FORM ENCOUNTER (OUTPATIENT)
Age: 44
End: 2019-09-10

## 2019-09-11 ENCOUNTER — APPOINTMENT (OUTPATIENT)
Dept: NUCLEAR MEDICINE | Facility: IMAGING CENTER | Age: 44
End: 2019-09-11
Payer: COMMERCIAL

## 2019-09-11 ENCOUNTER — OUTPATIENT (OUTPATIENT)
Dept: OUTPATIENT SERVICES | Facility: HOSPITAL | Age: 44
LOS: 1 days | End: 2019-09-11
Payer: COMMERCIAL

## 2019-09-11 DIAGNOSIS — D86.85 SARCOID MYOCARDITIS: ICD-10-CM

## 2019-09-11 DIAGNOSIS — Z98.890 OTHER SPECIFIED POSTPROCEDURAL STATES: Chronic | ICD-10-CM

## 2019-09-11 DIAGNOSIS — K08.409 PARTIAL LOSS OF TEETH, UNSPECIFIED CAUSE, UNSPECIFIED CLASS: Chronic | ICD-10-CM

## 2019-09-11 PROCEDURE — 78459 MYOCRD IMG PET SINGLE STUDY: CPT | Mod: 26

## 2019-09-11 PROCEDURE — 78451 HT MUSCLE IMAGE SPECT SING: CPT | Mod: 26

## 2019-09-11 PROCEDURE — 78451 HT MUSCLE IMAGE SPECT SING: CPT

## 2019-09-11 PROCEDURE — 78815 PET IMAGE W/CT SKULL-THIGH: CPT | Mod: 26

## 2019-09-11 PROCEDURE — A9552: CPT

## 2019-09-11 PROCEDURE — 78429 MYOCRD IMG PET 1 STD W/CT: CPT

## 2019-09-11 PROCEDURE — 78815 PET IMAGE W/CT SKULL-THIGH: CPT

## 2019-09-11 PROCEDURE — A9500: CPT

## 2019-09-12 ENCOUNTER — TRANSCRIPTION ENCOUNTER (OUTPATIENT)
Age: 44
End: 2019-09-12

## 2019-09-23 ENCOUNTER — RX RENEWAL (OUTPATIENT)
Age: 44
End: 2019-09-23

## 2019-09-27 ENCOUNTER — APPOINTMENT (OUTPATIENT)
Dept: PULMONOLOGY | Facility: CLINIC | Age: 44
End: 2019-09-27

## 2019-09-27 NOTE — DISCUSSION/SUMMARY
[Teratogenicity] : the risk of teratogenicity of the prescribed medication [Liver Toxicity] : the risk of liver toxicity and the need for monthly LFT monitoring [Contraception Use] : use of double barrier method of contraception [Eye Exam] : the risk of NAION and the need for annual eye exam [FreeTextEntry1] : ---Assessment plan----------The patient has been referred here for further opinion regarding pulmonary problem, 43 yo with CLINICAL AND RADIOLOGICAL FEATURES SUGGESTIVE OF SARCOIDOSIS  sarcoidosis, abn CT/PET.[  mediastinal and hilar  adenopathy  Not symptomatic.]\par Being w/u by heme for splenic lesions/splenomegaly.,\par \par 1 sarcoidosis-CARDIAC SARCOIDOSIS , SPLENIC INVOLVEMENT -----will start MTX 2.5 mg weekly x 1 month then uptitrate 5mg weekly- reviewed benefits/risks, side effects- drug ed given to pt. Labs drawn today-then monthly \par \par 2- chest pain- CARDIAC SARCOIDOSIS ON MRI--- s/p ekg w/conduction delay-  consult w/Dr Scherer- get echo ----WILL D/W GRAVER REGARDING EPS EVALUATION FOR SARCOIDOSIS\par 3-needs yearly eye exam\par 4- needs CPET -----to evaluate exercise tolerance\par \par Thanks for allowing  me to participate  in the care of this patient.  Patient at this time  will follow  the above mentioned recommendations and return back for follow up visit. If you have any questions  I can be reached  at #[phone] (beeper)  or  [phone] (office).\par \par Gareth Cadet MD, FCCP \par Director, Pulmonary Hypertension Program \par [facility] \par Division of Pulmonary, Critical Care and Sleep Medicine \par  Professor of Medicine \par [employer]\par

## 2019-09-27 NOTE — PHYSICAL EXAM
[Normal Appearance] : normal appearance [General Appearance - Well Developed] : well developed [Well Groomed] : well groomed [No Deformities] : no deformities [General Appearance - Well Nourished] : well nourished [Normal Conjunctiva] : the conjunctiva exhibited no abnormalities [General Appearance - In No Acute Distress] : no acute distress [Eyelids - No Xanthelasma] : the eyelids demonstrated no xanthelasmas [II] : II [Neck Appearance] : the appearance of the neck was normal [Neck Cervical Mass (___cm)] : no neck mass was observed [Jugular Venous Distention Increased] : there was no jugular-venous distention [Thyroid Diffuse Enlargement] : the thyroid was not enlarged [Thyroid Nodule] : there were no palpable thyroid nodules [Heart Rate And Rhythm] : heart rate and rhythm were normal [Murmurs] : no murmurs present [Heart Sounds] : normal S1 and S2 [Respiration, Rhythm And Depth] : normal respiratory rhythm and effort [Exaggerated Use Of Accessory Muscles For Inspiration] : no accessory muscle use [Auscultation Breath Sounds / Voice Sounds] : lungs were clear to auscultation bilaterally [Tenderness: ____] : tenderness [unfilled] [Abdomen Soft] : soft [Abdomen Tenderness] : non-tender [Abdomen Mass (___ Cm)] : no abdominal mass palpated [Abnormal Walk] : normal gait [Gait - Sufficient For Exercise Testing] : the gait was sufficient for exercise testing [Nail Clubbing] : no clubbing of the fingernails [Cyanosis, Localized] : no localized cyanosis [Petechial Hemorrhages (___cm)] : no petechial hemorrhages [Deep Tendon Reflexes (DTR)] : deep tendon reflexes were 2+ and symmetric [Sensation] : the sensory exam was normal to light touch and pinprick [No Focal Deficits] : no focal deficits [Oriented To Time, Place, And Person] : oriented to person, place, and time [Impaired Insight] : insight and judgment were intact [Affect] : the affect was normal [Skin Color & Pigmentation] : normal skin color and pigmentation [Skin Turgor] : normal skin turgor [] : no rash

## 2019-09-27 NOTE — HISTORY OF PRESENT ILLNESS
[FreeTextEntry1] : This letter  is regarding your patient  who  attended pulmonary out patient office today.  I have reviewed  patient's  past history, social history, family history and medication list. I also  reviewed nurse practitioners/ and fellows  notes and assessment and agree with it.  \par The patient was referred by  for  sarcoidosis, abn CT/PET. -on cardiac mri 2109 aslo seems to have features of cardiac sarcoidosis---also has splenic lesions -----\par Ebus 2019 --non caseating gramulomas   \par Being w/u by heme for splenic lesions/splenomegaly.---has followed  dr carmen mcdonough in the past at [facility] [till 2017] -----------FORMER SMOKER---\par \par Presented 2015 w/chest pain- CTA ruled out PE but incidental finding was lymphadenopathy. 2016- CT noted splenic lesions. Recent imaging shows lung nodule and increasing splenic lesions\par former smoker, quit at age 24, 9 pack year history. Works as sonographer\par \par pmh of asthma- controlled w/adviar, albuterol prn, daily zyrtec and nasonex\par \par ------No history of , fever, chills , rigors, chest pain, or hemoptysis.  No h/o significant weight loss in last few months. No history of liver dysfunction , collagen vascular disorder or chronic thromboembolic disease. I would classify the patient's dyspnea as WHO  FUNCTIONAL CLASS II--------\par \par ----Echo  date-----cardiology-\par ----Pft date---2019 mild obstruction\par \par -PET/CT FGD avid left lung nodule at apex, mediastinal and hilar lymphadenopthy, numerous splenic lesions------[done at an outside facility ]\par - CT CHEST 12/2018- MEDISATINAL AND BILATERAL  HILAR LN, WITH PULMONARY NODULES---THERE HAS BEEN AN INTERVAL DEVELOPMENT OF  PARENCHYMAL DISEASE \par -U/S GUIDED LN BIOPSY ----- 12/2018---REPORTED AS  GRANULOMATOUS LYMPHADENITIS WITHOUT NECROSIS\par \par cytopath 4/1/19 : non necrotizing granulomatous inflammation\par \par cardiac MRI 2019 - ??? past h/o  myocarditis\par \par cardiac pet/ct 9/2019 IMPRESSION: \par \par 1. Normal resting myocardial perfusion, wall motion, and left ventricular \par ejection fraction, with no scan evidence of myocardial inflammation. \par \par 2. FDG-avid lymph nodes in right supraclavicular region, bilateral \par mediastinal/hilar regions, and periportal region, not significantly changed \par as compared to FDG PET/CT dated 3/9/2019, correspond to biopsy-proven \par sarcoidosis. Few small FDG-avid bilateral inguinal lymph nodes are \par nonspecific. \par \par 3. FDG-avid bilateral pulmonary nodules and splenic lesions, also not \par significantly changed, may be secondary to sarcoidosis. The most FDG-avid \par lung lesion is in the left apex. A 3 month follow-up with dedicated CT of \par chest is recommended to assess for stability. \par \par 4. Probable physiologic left ovarian activity/left adnexal cyst. Pelvic \par ultrasound may be obtained for confirmation.

## 2019-11-26 ENCOUNTER — APPOINTMENT (OUTPATIENT)
Dept: FAMILY MEDICINE | Facility: CLINIC | Age: 44
End: 2019-11-26
Payer: COMMERCIAL

## 2019-11-26 VITALS
WEIGHT: 158 LBS | HEART RATE: 84 BPM | RESPIRATION RATE: 16 BRPM | SYSTOLIC BLOOD PRESSURE: 110 MMHG | OXYGEN SATURATION: 96 % | BODY MASS INDEX: 26.98 KG/M2 | DIASTOLIC BLOOD PRESSURE: 70 MMHG | HEIGHT: 64 IN

## 2019-11-26 DIAGNOSIS — Z86.69 PERSONAL HISTORY OF OTHER DISEASES OF THE NERVOUS SYSTEM AND SENSE ORGANS: ICD-10-CM

## 2019-11-26 PROCEDURE — 99213 OFFICE O/P EST LOW 20 MIN: CPT

## 2019-11-26 RX ORDER — TIOTROPIUM BROMIDE INHALATION SPRAY 3.12 UG/1
2.5 SPRAY, METERED RESPIRATORY (INHALATION)
Refills: 0 | Status: ACTIVE | COMMUNITY

## 2019-11-26 NOTE — HISTORY OF PRESENT ILLNESS
[FreeTextEntry8] : Patient is here today for an acute visit. \par She has been feeling unwell for the past 3 days. \par She started out with sore throat, congestion and fatigue. \par She developed severe left sided ear pain yesterday. \par She tried warm compresses and took advil which helped a little for a short time. \par Otherwise not taking anything over the counter for sore throat or congestion.\par No fevers. \par

## 2019-11-26 NOTE — PHYSICAL EXAM
[No Acute Distress] : no acute distress [Well Developed] : well developed [Well Nourished] : well nourished [Normal Sclera/Conjunctiva] : normal sclera/conjunctiva [Normal Oropharynx] : the oropharynx was normal [Normal Outer Ear/Nose] : the outer ears and nose were normal in appearance [PERRL] : pupils equal round and reactive to light [No Lymphadenopathy] : no lymphadenopathy [Supple] : supple [No Accessory Muscle Use] : no accessory muscle use [No Respiratory Distress] : no respiratory distress  [Regular Rhythm] : with a regular rhythm [Clear to Auscultation] : lungs were clear to auscultation bilaterally [Normal Rate] : normal rate  [Normal S1, S2] : normal S1 and S2 [Normal Anterior Cervical Nodes] : no anterior cervical lymphadenopathy [No Rash] : no rash [No Focal Deficits] : no focal deficits [Normal Gait] : normal gait [Normal Affect] : the affect was normal [Normal Insight/Judgement] : insight and judgment were intact [de-identified] : normal right TM, left TM bulging, red

## 2019-11-26 NOTE — REVIEW OF SYSTEMS
[Earache] : earache [Nasal Discharge] : nasal discharge [Sore Throat] : sore throat [Negative] : Neurological

## 2019-11-26 NOTE — ASSESSMENT
[FreeTextEntry1] : Otitis media\par - start augmentin\par - advil/ tylenol for pain\par - nasal spray for congestion\par - follow up if not improved after treatment

## 2020-01-08 ENCOUNTER — NON-APPOINTMENT (OUTPATIENT)
Age: 45
End: 2020-01-08

## 2020-01-08 ENCOUNTER — APPOINTMENT (OUTPATIENT)
Dept: CARDIOLOGY | Facility: CLINIC | Age: 45
End: 2020-01-08
Payer: COMMERCIAL

## 2020-01-08 VITALS
OXYGEN SATURATION: 98 % | SYSTOLIC BLOOD PRESSURE: 125 MMHG | WEIGHT: 161.5 LBS | DIASTOLIC BLOOD PRESSURE: 85 MMHG | HEART RATE: 77 BPM | BODY MASS INDEX: 27.57 KG/M2 | HEIGHT: 64 IN

## 2020-01-08 DIAGNOSIS — D86.85 SARCOID MYOCARDITIS: ICD-10-CM

## 2020-01-08 PROCEDURE — 93000 ELECTROCARDIOGRAM COMPLETE: CPT

## 2020-01-08 PROCEDURE — 99214 OFFICE O/P EST MOD 30 MIN: CPT

## 2020-01-08 NOTE — HISTORY OF PRESENT ILLNESS
[FreeTextEntry1] : 44 F with past medical history of Sarcoid with cardiac involvement seen on a cardiac MR, normal LV function, family history of early CAD.\par She has been diagnosed with pulmonary sarcoid with noted numerous splenic lesion noted of CT/PET. She had a cardiac MR in 4/2019 showing questionable subtle subepicardial enhancement along the LV basal to mid inferior wall with adjacent small pericardial effusion favoring a sequalla of prior mild myocarditis although cannot exclude subtle involvement of sarcoidosis. \par \par She is here for a followup visit. \par She had a PET scan 9/11/19 that did not show any signs of cardiac sarcoid. She had an event monitor which was negative for any arrhythmias or signs fo heart block. \par \par She has now transitioned her care to Dr. Angelia Stahl at [facility]. She was noted to have obstructive findings on her PFTs and her inhalers were changes.  Her dyspnea on exertion especially with climbing a flight of stairs has improved with her new therapy. \par \par Her chest pain has resolved.  She   denies any  PND, orthopnea, lower extremity edema, near syncope, syncope, strokelike symptoms.She denies any palpitations. \par She is compliant with her medications.

## 2020-01-08 NOTE — PHYSICAL EXAM
[Well Groomed] : well groomed [Normal Conjunctiva] : the conjunctiva exhibited no abnormalities [Eyelids - No Xanthelasma] : the eyelids demonstrated no xanthelasmas [General Appearance - In No Acute Distress] : no acute distress [No Oral Pallor] : no oral pallor [Normal Oral Mucosa] : normal oral mucosa [No Oral Cyanosis] : no oral cyanosis [Normal Jugular Venous A Waves Present] : normal jugular venous A waves present [Normal Jugular Venous V Waves Present] : normal jugular venous V waves present [No Jugular Venous Beavers A Waves] : no jugular venous beavers A waves [Auscultation Breath Sounds / Voice Sounds] : lungs were clear to auscultation bilaterally [Respiration, Rhythm And Depth] : normal respiratory rhythm and effort [Exaggerated Use Of Accessory Muscles For Inspiration] : no accessory muscle use [Abdomen Soft] : soft [Abdomen Tenderness] : non-tender [Abnormal Walk] : normal gait [Abdomen Mass (___ Cm)] : no abdominal mass palpated [Gait - Sufficient For Exercise Testing] : the gait was sufficient for exercise testing [Nail Clubbing] : no clubbing of the fingernails [Petechial Hemorrhages (___cm)] : no petechial hemorrhages [Cyanosis, Localized] : no localized cyanosis [Skin Color & Pigmentation] : normal skin color and pigmentation [Skin Lesions] : no skin lesions [No Venous Stasis] : no venous stasis [] : no rash [No Skin Ulcers] : no skin ulcer [Oriented To Time, Place, And Person] : oriented to person, place, and time [No Xanthoma] : no  xanthoma was observed [Affect] : the affect was normal [No Anxiety] : not feeling anxious [Normal Rate] : normal [Mood] : the mood was normal [Normal S2] : normal S2 [Rhythm Regular] : regular [Normal S1] : normal S1 [No Murmur] : no murmurs heard [2+] : left 2+ [No Pitting Edema] : no pitting edema present [Left Carotid Bruit] : no bruit heard over the left carotid [Right Carotid Bruit] : no bruit heard over the right carotid [Bruit] : no bruit heard

## 2020-01-08 NOTE — DISCUSSION/SUMMARY
[FreeTextEntry1] : 44 year woman with a history as listed presents for a followup visit. \par Alayna is doing much better. She denies any anginal symptoms. Clinically she is euvolemic on exam. Her EKG did not reveal any significant ischemic changes. She had an exercise stress test unrevealing for ischemia on 3/28/19. She did 9 minutes on avelina protocol which resulted in Good exercise tolerance. She had a PET scan 9/11/19 that did not show any signs of cardiac sarcoid. She had an event monitor which was negative for any arrhythmias or signs of heart block.  \par Therefore, her EUGENE is more likely secondary lung issues or deconditioning. \par She will continue Pravastatin. She will have her baseline lab work, including lipids, done prior to the next visit. \par Exercise and diet counseling was performed in order to reduce her future cardiovascular risk.\par She will followup with me in 6  months or sooner if necessary.

## 2020-03-03 ENCOUNTER — APPOINTMENT (OUTPATIENT)
Dept: ALLERGY | Facility: CLINIC | Age: 45
End: 2020-03-03
Payer: COMMERCIAL

## 2020-03-03 VITALS
HEART RATE: 88 BPM | BODY MASS INDEX: 27.83 KG/M2 | WEIGHT: 163 LBS | HEIGHT: 64 IN | SYSTOLIC BLOOD PRESSURE: 127 MMHG | DIASTOLIC BLOOD PRESSURE: 82 MMHG | OXYGEN SATURATION: 96 %

## 2020-03-03 PROCEDURE — 99205 OFFICE O/P NEW HI 60 MIN: CPT

## 2020-03-03 RX ORDER — METHOTREXATE 2.5 MG/1
2.5 TABLET ORAL
Qty: 8 | Refills: 1 | Status: DISCONTINUED | COMMUNITY
Start: 2019-05-03 | End: 2020-03-03

## 2020-03-03 RX ORDER — AMOXICILLIN AND CLAVULANATE POTASSIUM 875; 125 MG/1; MG/1
875-125 TABLET, COATED ORAL
Qty: 14 | Refills: 0 | Status: DISCONTINUED | COMMUNITY
Start: 2019-11-26 | End: 2020-03-03

## 2020-03-03 RX ORDER — FOLIC ACID 1 MG/1
1 TABLET ORAL DAILY
Qty: 30 | Refills: 5 | Status: DISCONTINUED | COMMUNITY
Start: 2019-08-07 | End: 2020-03-03

## 2020-03-03 RX ORDER — FLUTICASONE PROPIONATE AND SALMETEROL 250; 50 UG/1; UG/1
250-50 POWDER RESPIRATORY (INHALATION) TWICE DAILY
Qty: 1 | Refills: 2 | Status: DISCONTINUED | COMMUNITY
End: 2020-03-03

## 2020-03-03 NOTE — HISTORY OF PRESENT ILLNESS
[Venom Reactions] : venom reactions [de-identified] : Patient had unexplained chest pain and CT revealed hilar lymph nodes and splenic nodules.    She then had a follow up PET CT apical lung nodule - EBUS hilar biopsy - granulomatous lymphadenitis.    [place] [facility] [place] was abnormal and she was prescribed Breo 200, Singulair and Spiriva - improvement in PFTs with some improvement in her ability to exert herself.   Her sarcoid physician did not feel treatment was necessary at this time. \par \par Patient had allergy skin testing in 2009 and she was found to have multiple environmental allergies.   She had a systemic reaction to the allergy skin testing requiring EMS transfer to the hospital.   \par \par Patient with a history of recurrent coughing, sneezing and wheezing thru out the year.   She takes Zyrtec daily with some relief in her symptoms.   \par \par Patient with a rash on her hands with latex exposure.    With banana ingestion she has oral itching.   In the past avocado but she can eat now without symptoms.   In the past raw carrots caused oral itching.   Anchovies can result in oral itching.  \par \par Adhesive she will have a rash on her skin.

## 2020-03-03 NOTE — ASSESSMENT
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

## 2020-03-03 NOTE — PHYSICAL EXAM
[Alert] : alert [Well Nourished] : well nourished [Healthy Appearance] : healthy appearance [No Acute Distress] : no acute distress [Well Developed] : well developed [Normal Pupil & Iris Size/Symmetry] : normal pupil and iris size and symmetry [No Discharge] : no discharge [Sclera Not Icteric] : sclera not icteric [Normal Lips/Tongue] : the lips and tongue were normal [Normal Nasal Mucosa] : the nasal mucosa was normal [Normal Tonsils] : normal tonsils [No Thrush] : no thrush [Normal Dentition] : normal dentition [No Oral Lesions or Ulcers] : no oral lesions or ulcers [Boggy Nasal Turbinates] : no boggy and/or pale nasal turbinates [Posterior Pharyngeal Cobblestoning] : no posterior pharyngeal cobblestoning [No LAD] : no lymphadenopathy [No Neck Mass] : no neck mass was observed [No Thyroid Mass] : no thyroid mass [Supple] : the neck was supple [Normal Rate and Effort] : normal respiratory rhythm and effort [No Retractions] : no retractions [No Crackles] : no crackles [Wheezing] : no wheezing was heard [Bilateral Audible Breath Sounds] : bilateral audible breath sounds [Normal Rate] : heart rate was normal  [No murmur] : no murmur [Regular Rhythm] : with a regular rhythm [Normal S1, S2] : normal S1 and S2 [Normal Cervical Lymph Nodes] : cervical [Skin Intact] : skin intact  [No Skin Lesions] : no skin lesions [No Rash] : no rash [No Joint Swelling or Erythema] : no joint swelling or erythema [No Edema] : no edema [No Cyanosis] : no cyanosis [No clubbing] : no clubbing [Normal Affect] : affect was normal [Alert, Awake, Oriented as Age-Appropriate] : alert, awake, oriented as age appropriate [Normal Mood] : mood was normal

## 2020-03-03 NOTE — SOCIAL HISTORY
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

## 2020-03-06 ENCOUNTER — APPOINTMENT (OUTPATIENT)
Dept: OBGYN | Facility: CLINIC | Age: 45
End: 2020-03-06
Payer: COMMERCIAL

## 2020-03-06 VITALS
SYSTOLIC BLOOD PRESSURE: 126 MMHG | WEIGHT: 165 LBS | BODY MASS INDEX: 29.23 KG/M2 | HEIGHT: 63 IN | DIASTOLIC BLOOD PRESSURE: 80 MMHG

## 2020-03-06 DIAGNOSIS — Z78.9 OTHER SPECIFIED HEALTH STATUS: ICD-10-CM

## 2020-03-06 DIAGNOSIS — N60.01 SOLITARY CYST OF RIGHT BREAST: ICD-10-CM

## 2020-03-06 DIAGNOSIS — Z12.39 ENCOUNTER FOR OTHER SCREENING FOR MALIGNANT NEOPLASM OF BREAST: ICD-10-CM

## 2020-03-06 DIAGNOSIS — Z83.49 FAMILY HISTORY OF OTHER ENDOCRINE, NUTRITIONAL AND METABOLIC DISEASES: ICD-10-CM

## 2020-03-06 DIAGNOSIS — Z01.419 ENCOUNTER FOR GYNECOLOGICAL EXAMINATION (GENERAL) (ROUTINE) W/OUT ABNORMAL FINDINGS: ICD-10-CM

## 2020-03-06 DIAGNOSIS — Z12.11 ENCOUNTER FOR SCREENING FOR MALIGNANT NEOPLASM OF COLON: ICD-10-CM

## 2020-03-06 DIAGNOSIS — N60.02 SOLITARY CYST OF RIGHT BREAST: ICD-10-CM

## 2020-03-06 DIAGNOSIS — Z83.3 FAMILY HISTORY OF DIABETES MELLITUS: ICD-10-CM

## 2020-03-06 LAB
DATE COLLECTED: NORMAL
HEMOCCULT SP1 STL QL: NEGATIVE
QUALITY CONTROL: YES

## 2020-03-06 PROCEDURE — 99396 PREV VISIT EST AGE 40-64: CPT

## 2020-03-06 PROCEDURE — 82270 OCCULT BLOOD FECES: CPT

## 2020-03-06 RX ORDER — ONDANSETRON 4 MG/1
4 TABLET, ORALLY DISINTEGRATING ORAL
Qty: 60 | Refills: 1 | Status: DISCONTINUED | COMMUNITY
Start: 2019-07-19 | End: 2020-03-06

## 2020-03-06 NOTE — DISCUSSION/SUMMARY
[FreeTextEntry1] : 45 year old  3 para 2012, whose last menstrual period was 10/09/2019, is here for an annual exam.\par \par Ms. MCLEAN has a history of breast cysts seen on sonogram.  On inspection, there were no skin color changes, dimpling or retraction seen.  Aside from being fibrocystic, there were no masses or axillary lymphadenopathy appreciated on palpation.  Her mammogram was performed on 2019.  On 2019, the right breast ultrasound revealed at "8:00, 5 cm from the nipple, 9 mm complicated/septated cyst versus cluster of cysts, unchanged and at 10:00, 10 cm from the nipple, 6 mm benign-appearing morphologically normal intramammary lymph node slightly decreased in size."  On the left, at "12:00, 2 cm from nipple, 6 mm cluster of cyst, unchanged."  Continued monthly self breast examination was advised and a referral for mammogram with ultrasound was provided.\par \par Her periods are irregular and she has experienced five months of amenorrhea.  She denies hot flashes admitting "I'm always hot."  She has her thyroid nodule checked regularly.  The definition and expectations of the menopause were outlined.\par \par ANGE complains of worsening stress incontinence.  The differential diagnosis was outlined and she was offered consultation with a urogynecologist.  In the interim, she will make a more concerted effort at Kegel exercises.  Pelvic strengthening via physical therapy was also discussed and she will consider her decision.\par \par The importance of consuming adequate daily calcium, vitamin D3 combined with weight bearing exercise for bone protection was underscored.\par \par All of her concerns were addressed, questions answered and reassurance was given.

## 2020-03-06 NOTE — PHYSICAL EXAM
[Awake] : awake [Alert] : alert [Soft] : soft [Oriented x3] : oriented to person, place, and time [Labia Majora] : labia major [Labia Minora] : labia minora [Normal] : clitoris [No Bleeding] : there was no active vaginal bleeding [Uterine Adnexae] : were not tender and not enlarged [No Tenderness] : no rectal tenderness [Nl Sphincter Tone] : normal sphincter tone [RRR, No Murmurs] : RRR, no murmurs [CTAB] : CTAB [Acute Distress] : no acute distress [LAD] : no lymphadenopathy [Thyroid Nodule] : no thyroid nodule [Goiter] : no goiter [Mass] : no breast mass [Nipple Discharge] : no nipple discharge [Axillary LAD] : no axillary lymphadenopathy [Tender] : non tender [Occult Blood] : occult blood test from digital rectal exam was negative

## 2020-03-06 NOTE — HISTORY OF PRESENT ILLNESS
[Last Pap Smear ___] : last Papanicolaou cytology done [unfilled] [Last Mammogram ___] : last mammogram done [unfilled] [Perimenopausal] : is perimenopausal [Definite:  ___ (Date)] : the last menstrual period was [unfilled] [Menarche Age: ____] : age at menarche was [unfilled] [Sexually Active] : is sexually active [Monogamous] : is monogamous [Male ___] : [unfilled] male [___ Year(s) Ago] : [unfilled] year(s) ago [Last Colonoscopy ___] : last colonoscopy done [unfilled] [Good] : being in good health [Healthy Diet] : a healthy diet [Contraception] : uses contraception [Partner Vasectomy] : has a partner with a vasectomy [Irregular Menses] : irregular menses [Regular Exercise] : not exercising regularly [Burning] : no burning [Itching] : no itching [Mass] : no mass [Stinging] : no stinging [Lesion] : no lesion [Soreness] : no soreness [Discharge] : no discharge [Prolonged Menses] : menses of normal duration [Localized Pain] : no localized pain [Mass (___cm)] : no palpable mass [Diffused Pain] : no diffused pain [Nipple Discharge] : no nipple discharge [Skin Color Change] : no skin color change [Hot Flashes] : no hot flashes [Night Sweats] : no night sweats [Vaginal Itching] : no vaginal itching [Dyspareunia] : no dyspareunia [Mood Changes] : no mood changes

## 2020-03-09 LAB — HPV HIGH+LOW RISK DNA PNL CVX: NOT DETECTED

## 2020-03-16 ENCOUNTER — RX RENEWAL (OUTPATIENT)
Age: 45
End: 2020-03-16

## 2020-04-26 ENCOUNTER — MESSAGE (OUTPATIENT)
Age: 45
End: 2020-04-26

## 2020-05-06 ENCOUNTER — APPOINTMENT (OUTPATIENT)
Dept: RHEUMATOLOGY | Facility: CLINIC | Age: 45
End: 2020-05-06
Payer: COMMERCIAL

## 2020-05-06 VITALS
OXYGEN SATURATION: 96 % | HEART RATE: 101 BPM | SYSTOLIC BLOOD PRESSURE: 110 MMHG | BODY MASS INDEX: 29.23 KG/M2 | WEIGHT: 165 LBS | TEMPERATURE: 98 F | HEIGHT: 63 IN | DIASTOLIC BLOOD PRESSURE: 70 MMHG

## 2020-05-06 DIAGNOSIS — M70.61 TROCHANTERIC BURSITIS, RIGHT HIP: ICD-10-CM

## 2020-05-06 DIAGNOSIS — M77.12 LATERAL EPICONDYLITIS, LEFT ELBOW: ICD-10-CM

## 2020-05-06 DIAGNOSIS — Z82.62 FAMILY HISTORY OF OSTEOPOROSIS: ICD-10-CM

## 2020-05-06 PROCEDURE — 99205 OFFICE O/P NEW HI 60 MIN: CPT | Mod: 25

## 2020-05-06 PROCEDURE — 36415 COLL VENOUS BLD VENIPUNCTURE: CPT

## 2020-05-06 PROCEDURE — 20610 DRAIN/INJ JOINT/BURSA W/O US: CPT | Mod: RT

## 2020-05-06 RX ORDER — METHYLPRED ACET/NACL,ISO-OS/PF 80 MG/ML
80 VIAL (ML) INJECTION
Qty: 1 | Refills: 0 | Status: COMPLETED | OUTPATIENT
Start: 2020-05-06

## 2020-05-06 RX ADMIN — METHYLPREDNISOLONE ACETATE 80 MG/ML: 80 INJECTION, SUSPENSION INTRA-ARTICULAR; INTRALESIONAL; INTRAMUSCULAR; SOFT TISSUE at 00:00

## 2020-05-06 NOTE — REASON FOR VISIT
[Consultation] : a consultation visit [FreeTextEntry1] : Sarcoid; joint aches; bone health; Rt sided hip pain

## 2020-05-06 NOTE — HISTORY OF PRESENT ILLNESS
[FreeTextEntry1] : 45-year-old female here for the first time with history of sarcoidosis. Patient states she was noted to have hilar lymphadenopathy and mediastinal lymphadenopathy since around 2015.\par She states she does have history of hilar lymph node biopsy and groin node biopsy in 2018 confirming sarcoid.\par State that states she was seeing Dr. Helio Lewis in the past and now Dr. Angelia Stahl for sarcoidosis and asthma. States she was put on very lost of MTX 5mg in the past by Dr. Helio Lewis and reports told it is not needed by her new pulmDr. Stahl in her opinion. \par States she has history of a small pericardial effusion for the past few years and denies any SOB or CP. \par States she has history of splenic nodules with this for the past few years.\par Denies any eye involvement with sarcoidosis and states she does go for her yearly eye exams with ophthalmologist.\par States she notices some pain in her right thumb, at the base. Denies any pain in any of the fingers. Denies any swelling of any joints including the ankles.\par States she notices some left elbow pain intermittently. \par States she notices some achy right-sided hip pain with trouble sleeping on it for the past few months. Denies any injury or trauma. She is requesting steroid injection for it today. \par Denies any fever/chills, no rashes, no ulcers, no dry eyes, no dry mouth, no raynaud's, no infectious diarrhea or  symptoms at this time.\par States she has family history of osteoporosis in her mother and her sister as well as her maternal aunts. States she has history of fractured toe and finger in the past. States she is a former smoker. States she thinks is becoming postmenopausal with no period in over 8 mo and knows to monitor w/ her Ob/GYN. \par \par

## 2020-05-06 NOTE — ASSESSMENT
[FreeTextEntry1] : 45-year-old female here for the first time with history of sarcoidosis. Patient states she was noted to have hilar lymphadenopathy and mediastinal lymphadenopathy since around 2015.\par She states she does have history of hilar lymph node biopsy and groin node biopsy in 2018 confirming sarcoid w/ hx of high ACE.\par -requested and reviewed Ct chest 4/27/2020 from [facility] w/ mediastinal and b/l hilar lymphadenopathy unchanged from the recent PET/CT examinations; small pericardial effusion (for past few yrs per pt); splenic hypodense foci\par -labs as below requested \par -has mainly Rt CMC pain today; Lt elbow pain and Rt sided hip pain \par -states she was on low MTX 5mg weekly w/ prior Pulm, Dr. Cadet and now seeing, Dr. Angelia Stahl who told her not necessary in her opinion at this time w/ her asthma more active than the sarcoid \par -defer MTX w/ social drinking \par -will consider HCQ as discussed pending results \par -Referred for xray of b/l hands to evaluate for structural changes, eval for [facility] at [facility] [place] \par -knows to go for yearly eye exam w/ Optho and states she does w/ next eye exam 9/2020\par -states she sees cardiologist yearly for regular monitoring and Echo also\par \par Rt trochanteric bursitis: offered Depomedrol 80mg injection today and patient agreeable.\par \par Lateral epicondylitis: discussed using ace band right below elbow and to rest it.\par -discussed can consider steroid injection if no improved.\par \par Bone health: Dexa referral provided w/ family history of osteoporosis in her mother, her sister as well as her maternal aunts. States she has history of fractured toe and finger in the past. States she is a former smoker. States she thinks is becoming postmenopausal with no period in over 8 mo and knows to monitor w/ her Ob/GYN. \par \par -educated on symptoms to monitor for in detail and alert us if any concerns.\par -f/u in 10-14days w/ labs, xray, Dexa please\par

## 2020-05-06 NOTE — PHYSICAL EXAM
[General Appearance - Alert] : alert [General Appearance - Well Nourished] : well nourished [Sclera] : the sclera and conjunctiva were normal [Extraocular Movements] : extraocular movements were intact [Outer Ear] : the ears and nose were normal in appearance [Nasal Cavity] : the nasal mucosa and septum were normal [Neck Appearance] : the appearance of the neck was normal [Respiration, Rhythm And Depth] : normal respiratory rhythm and effort [Auscultation Breath Sounds / Voice Sounds] : lungs were clear to auscultation bilaterally [Heart Rate And Rhythm] : heart rate was normal and rhythm regular [Heart Sounds] : normal S1 and S2 [Bowel Sounds] : normal bowel sounds [Abdomen Soft] : soft [Abdomen Tenderness] : non-tender [Cervical Lymph Nodes Enlarged Anterior Bilaterally] : anterior cervical [Supraclavicular Lymph Nodes Enlarged Bilaterally] : supraclavicular [No CVA Tenderness] : no ~M costovertebral angle tenderness [No Spinal Tenderness] : no spinal tenderness [Motor Tone] : muscle strength and tone were normal [] : no rash [Cranial Nerves] : cranial nerves 2-12 were intact [No Focal Deficits] : no focal deficits [Impaired Insight] : insight and judgment were intact [Mood] : the mood was normal [FreeTextEntry1] : tenderness in Rt CMC; tenderness at Lt lateral epicondyle; Rt trochanteric bursa tenderness

## 2020-05-06 NOTE — PROCEDURE
[Today's Date:] : Date: [unfilled] [Risks] : risks [Benefits] : benefits [Alternatives] : alternatives [Consent Obtained] : written consent was obtained prior to the procedure and is detailed in the patient's record [Patient] : Prior to the start of the procedure a time out was taken and the identity of the patient was confirmed via name and date of birth with the patient. The correct site and the procedure to be performed were confirmed. The correct side was confirmed if applicable. The availability of the correct equipment was verified [Therapeutic] : therapeutic [#1 Site: ______] : #1 site identified in the [unfilled] [Ethyl Chloride] : ethyl chloride [Betadine] : betadine solution [Alcohol] : alcohol [22 gauge 1.5 inch] : A 22 gauge 1.5 inch needle was used [___ml 1% Lidocaine] : [unfilled] ml of 1% lidocaine [Depomedrol ___ mg] : Depomedrol [unfilled] mg [Tolerated Well] : the patient tolerated the procedure well [No Complications] : there were no complications

## 2020-05-07 LAB
ALBUMIN SERPL ELPH-MCNC: 4.3 G/DL
ALP BLD-CCNC: 64 U/L
ALT SERPL-CCNC: 22 U/L
ANION GAP SERPL CALC-SCNC: 12 MMOL/L
AST SERPL-CCNC: 29 U/L
BASOPHILS # BLD AUTO: 0.02 K/UL
BASOPHILS NFR BLD AUTO: 0.4 %
BILIRUB SERPL-MCNC: 1.3 MG/DL
BUN SERPL-MCNC: 12 MG/DL
CALCIUM SERPL-MCNC: 9.4 MG/DL
CCP AB SER IA-ACNC: <8 UNITS
CHLORIDE SERPL-SCNC: 103 MMOL/L
CO2 SERPL-SCNC: 26 MMOL/L
CREAT SERPL-MCNC: 0.94 MG/DL
CRP SERPL-MCNC: 0.11 MG/DL
EOSINOPHIL # BLD AUTO: 0.12 K/UL
EOSINOPHIL NFR BLD AUTO: 2.7 %
ERYTHROCYTE [SEDIMENTATION RATE] IN BLOOD BY WESTERGREN METHOD: 34 MM/HR
GLUCOSE SERPL-MCNC: 110 MG/DL
HBV CORE IGM SER QL: NONREACTIVE
HBV SURFACE AG SER QL: NONREACTIVE
HCT VFR BLD CALC: 41.5 %
HCV AB SER QL: NONREACTIVE
HCV S/CO RATIO: 0.23 S/CO
HGB BLD-MCNC: 13.9 G/DL
IMM GRANULOCYTES NFR BLD AUTO: 0.4 %
LYMPHOCYTES # BLD AUTO: 0.87 K/UL
LYMPHOCYTES NFR BLD AUTO: 19.5 %
MAN DIFF?: NORMAL
MCHC RBC-ENTMCNC: 31.4 PG
MCHC RBC-ENTMCNC: 33.5 GM/DL
MCV RBC AUTO: 93.7 FL
MONOCYTES # BLD AUTO: 0.5 K/UL
MONOCYTES NFR BLD AUTO: 11.2 %
NEUTROPHILS # BLD AUTO: 2.93 K/UL
NEUTROPHILS NFR BLD AUTO: 65.8 %
PLATELET # BLD AUTO: 247 K/UL
POTASSIUM SERPL-SCNC: 3.9 MMOL/L
PROT SERPL-MCNC: 6.8 G/DL
RBC # BLD: 4.43 M/UL
RBC # FLD: 12 %
RF+CCP IGG SER-IMP: NEGATIVE
RHEUMATOID FACT SER QL: 12 IU/ML
SODIUM SERPL-SCNC: 142 MMOL/L
WBC # FLD AUTO: 4.46 K/UL

## 2020-05-11 ENCOUNTER — RX RENEWAL (OUTPATIENT)
Age: 45
End: 2020-05-11

## 2020-05-11 LAB
ACE BLD-CCNC: 74 U/L
G6PD SER-CCNC: 15.3 U/G HGB

## 2020-05-28 ENCOUNTER — APPOINTMENT (OUTPATIENT)
Dept: RHEUMATOLOGY | Facility: CLINIC | Age: 45
End: 2020-05-28
Payer: COMMERCIAL

## 2020-05-28 VITALS
WEIGHT: 162 LBS | TEMPERATURE: 97.9 F | BODY MASS INDEX: 28.7 KG/M2 | OXYGEN SATURATION: 96 % | HEART RATE: 71 BPM | SYSTOLIC BLOOD PRESSURE: 100 MMHG | HEIGHT: 63 IN | DIASTOLIC BLOOD PRESSURE: 60 MMHG

## 2020-05-28 DIAGNOSIS — R59.0 LOCALIZED ENLARGED LYMPH NODES: ICD-10-CM

## 2020-05-28 DIAGNOSIS — D86.9 SARCOIDOSIS, UNSPECIFIED: ICD-10-CM

## 2020-05-28 DIAGNOSIS — Z71.2 PERSON CONSULTING FOR EXPLANATION OF EXAMINATION OR TEST FINDINGS: ICD-10-CM

## 2020-05-28 DIAGNOSIS — M19.049 PRIMARY OSTEOARTHRITIS, UNSPECIFIED HAND: ICD-10-CM

## 2020-05-28 DIAGNOSIS — R70.0 ELEVATED ERYTHROCYTE SEDIMENTATION RATE: ICD-10-CM

## 2020-05-28 DIAGNOSIS — M85.80 OTHER SPECIFIED DISORDERS OF BONE DENSITY AND STRUCTURE, UNSPECIFIED SITE: ICD-10-CM

## 2020-05-28 PROCEDURE — 99214 OFFICE O/P EST MOD 30 MIN: CPT

## 2020-05-28 NOTE — REASON FOR VISIT
[Follow-Up: _____] : a [unfilled] follow-up visit [FreeTextEntry1] : Sarcoid; review labs/xrays/med; review Dexa \par

## 2020-05-28 NOTE — ASSESSMENT
[FreeTextEntry1] : 45-year-old female here for first follow up with history of sarcoidosis. Patient states she was noted to have hilar lymphadenopathy and mediastinal lymphadenopathy since around 2015.\par She states she does have history of hilar lymph node biopsy and groin node biopsy in 2018 confirming sarcoid w/ hx of high ACE.\par -Ct chest 4/27/2020 from [facility] w/ mediastinal and b/l hilar lymphadenopathy unchanged from the recent PET/CT examinations; small pericardial effusion (for past few yrs per pt); splenic hypodense foci\par -reviewed labs 5/6/2020 w/ raised ESR; NL CRP; Nl ACE now (high before= 91) \par -given raised ESR, hx of small pericardial effusion and mild arthralgias in the hands discussed HCQ \par -discussed r/b/s of Plaquenil 200mg PO BID w/ G6PD normal pt agreeable and prescription sent as below\par -optho referral provided for retinal screening on Plaquenil as discussed\par -states she was on low MTX 5mg weekly w/ prior Pulm, Dr. Cadet and now seeing, Dr. Angelia Stahl who told her not necessary in her opinion at this time w/ her asthma more active than the sarcoid \par -defer MTX w/ social drinking \par -reviewed xray b/l hands 5/14/2020 read as normal \par -knows to go for yearly eye exam w/ Optho with sarcoid \par -states she sees cardiologist yearly for regular monitoring and Echo also\par \par Osteopenia: reviewed Dexa 5/14/2020 w/ worse t score -1.0 at left femur neck\par -continue Ca+vitD supplementation\par -encouraged weight bearing exercises as tolerated.\par  \par -educated on symptoms to monitor for in detail and alert us if any concerns.\par -f/u in 6-8 weeks w/ labs or sooner as needed

## 2020-05-28 NOTE — HISTORY OF PRESENT ILLNESS
[FreeTextEntry1] : 45-year-old female here for the first time with history of sarcoidosis. Patient states she was noted to have hilar lymphadenopathy and mediastinal lymphadenopathy since around 2015.\par She states she does have history of hilar lymph node biopsy and groin node biopsy in 2018 confirming sarcoid.\par State that states she was seeing Dr. Helio Lewis in the past and now Dr. Angelia Stahl for sarcoidosis and asthma. States she was put on very lost of MTX 5mg in the past by Dr. Helio Lewis and reports told it is not needed by her new pulmDr. Stahl in her opinion. \par States she has history of a small pericardial effusion for the past few years and denies any SOB or CP. \par States she has history of splenic nodules with this for the past few years.\par Denies any eye involvement with sarcoidosis and states she does go for her yearly eye exams with ophthalmologist.\par States she notices some pain in her right >Lt thumb in the hands. \par States she notices some left elbow pain intermittently.\par Denies any fever/chills, no rashes, no ulcers, no dry eyes, no dry mouth, no raynaud's, no infectious diarrhea or  symptoms at this time.\par States she has family history of osteoporosis in her mother and her sister as well as her maternal aunts. States she has history of fractured toe and finger in the past. States she is a former smoker. States she thinks is becoming postmenopausal with no period in over 8 mo and knows to monitor w/ her Ob/GYN. \par \par

## 2020-05-28 NOTE — REVIEW OF SYSTEMS
[As Noted in HPI] : as noted in HPI [Fever] : no fever [Chills] : no chills [Eye Pain] : no eye pain [Earache] : no earache [Red Eyes] : eyes not red [Nosebleeds] : no nosebleeds [Chest Pain] : no chest pain [Shortness Of Breath] : no shortness of breath [Abdominal Pain] : no abdominal pain [Vomiting] : no vomiting [Skin Lesions] : no skin lesions [Dysuria] : no dysuria [Convulsions] : no convulsions [Confused] : no confusion [Suicidal] : not suicidal [Proptosis] : no proptosis [Muscle Weakness] : no muscle weakness [Easy Bleeding] : no tendency for easy bleeding

## 2020-05-28 NOTE — PHYSICAL EXAM
[General Appearance - Well Nourished] : well nourished [General Appearance - Alert] : alert [Sclera] : the sclera and conjunctiva were normal [Extraocular Movements] : extraocular movements were intact [Nasal Cavity] : the nasal mucosa and septum were normal [Outer Ear] : the ears and nose were normal in appearance [Neck Appearance] : the appearance of the neck was normal [Respiration, Rhythm And Depth] : normal respiratory rhythm and effort [Auscultation Breath Sounds / Voice Sounds] : lungs were clear to auscultation bilaterally [Heart Rate And Rhythm] : heart rate was normal and rhythm regular [Bowel Sounds] : normal bowel sounds [Heart Sounds] : normal S1 and S2 [Abdomen Soft] : soft [Abdomen Tenderness] : non-tender [Cervical Lymph Nodes Enlarged Anterior Bilaterally] : anterior cervical [Supraclavicular Lymph Nodes Enlarged Bilaterally] : supraclavicular [No Spinal Tenderness] : no spinal tenderness [No CVA Tenderness] : no ~M costovertebral angle tenderness [Motor Tone] : muscle strength and tone were normal [] : no rash [No Focal Deficits] : no focal deficits [Cranial Nerves] : cranial nerves 2-12 were intact [Mood] : the mood was normal [Impaired Insight] : insight and judgment were intact [FreeTextEntry1] : mild tenderness base of the thumbs

## 2020-07-29 ENCOUNTER — RX RENEWAL (OUTPATIENT)
Age: 45
End: 2020-07-29

## 2020-08-14 ENCOUNTER — APPOINTMENT (OUTPATIENT)
Dept: ENDOCRINOLOGY | Facility: CLINIC | Age: 45
End: 2020-08-14

## 2020-08-28 ENCOUNTER — NON-APPOINTMENT (OUTPATIENT)
Age: 45
End: 2020-08-28

## 2020-08-28 ENCOUNTER — APPOINTMENT (OUTPATIENT)
Dept: FAMILY MEDICINE | Facility: CLINIC | Age: 45
End: 2020-08-28
Payer: COMMERCIAL

## 2020-08-28 VITALS
OXYGEN SATURATION: 98 % | RESPIRATION RATE: 16 BRPM | TEMPERATURE: 98.3 F | HEIGHT: 63 IN | HEART RATE: 74 BPM | DIASTOLIC BLOOD PRESSURE: 64 MMHG | SYSTOLIC BLOOD PRESSURE: 102 MMHG | BODY MASS INDEX: 28.17 KG/M2 | WEIGHT: 159 LBS

## 2020-08-28 DIAGNOSIS — O24.414 GESTATIONAL DIABETES MELLITUS IN PREGNANCY, INSULIN CONTROLLED: ICD-10-CM

## 2020-08-28 DIAGNOSIS — I34.0 NONRHEUMATIC MITRAL (VALVE) INSUFFICIENCY: ICD-10-CM

## 2020-08-28 DIAGNOSIS — Z23 ENCOUNTER FOR IMMUNIZATION: ICD-10-CM

## 2020-08-28 DIAGNOSIS — I07.1 RHEUMATIC TRICUSPID INSUFFICIENCY: ICD-10-CM

## 2020-08-28 PROCEDURE — 90686 IIV4 VACC NO PRSV 0.5 ML IM: CPT

## 2020-08-28 PROCEDURE — G0442 ANNUAL ALCOHOL SCREEN 15 MIN: CPT | Mod: NC

## 2020-08-28 PROCEDURE — G0008: CPT

## 2020-08-28 PROCEDURE — 36415 COLL VENOUS BLD VENIPUNCTURE: CPT

## 2020-08-28 PROCEDURE — 99396 PREV VISIT EST AGE 40-64: CPT | Mod: 25

## 2020-08-28 PROCEDURE — 93000 ELECTROCARDIOGRAM COMPLETE: CPT

## 2020-08-28 NOTE — HEALTH RISK ASSESSMENT
[Very Good] : ~his/her~  mood as very good [Yes] : Yes [1 or 2 (0 pts)] : 1 or 2 (0 points) [2 - 4 times a month (2 pts)] : 2-4 times a month (2 points) [Never (0 pts)] : Never (0 points) [No] : In the past 12 months have you used drugs other than those required for medical reasons? No [0] : 2) Feeling down, depressed, or hopeless: Not at all (0) [Patient reported mammogram was normal] : Patient reported mammogram was normal [Patient reported PAP Smear was normal] : Patient reported PAP Smear was normal [None] : None [With Family] : lives with family [Feels Safe at Home] : Feels safe at home [Carbon Monoxide Detector] : carbon monoxide detector [Smoke Detector] : smoke detector [Seat Belt] :  uses seat belt [Sunscreen] : uses sunscreen [] : No [Audit-CScore] : 2 [[id number]] : 0 [Change in mental status noted] : No change in mental status noted [Language] : denies difficulty with language [Reports changes in hearing] : Reports no changes in hearing [Reports changes in vision] : Reports no changes in vision [Reports changes in dental health] : Reports no changes in dental health [TB Exposure] : is not being exposed to tuberculosis [MammogramDate] : 8/2020 [PapSmearDate] : 3/2020 [HIVDate] : 1/2005

## 2020-08-28 NOTE — PHYSICAL EXAM
[No Acute Distress] : no acute distress [Well Nourished] : well nourished [Well Developed] : well developed [Normal Sclera/Conjunctiva] : normal sclera/conjunctiva [PERRL] : pupils equal round and reactive to light [Well-Appearing] : well-appearing [Normal Outer Ear/Nose] : the outer ears and nose were normal in appearance [EOMI] : extraocular movements intact [Normal Oropharynx] : the oropharynx was normal [No JVD] : no jugular venous distention [No Lymphadenopathy] : no lymphadenopathy [Supple] : supple [Thyroid Normal, No Nodules] : the thyroid was normal and there were no nodules present [No Respiratory Distress] : no respiratory distress  [Normal Rate] : normal rate  [Clear to Auscultation] : lungs were clear to auscultation bilaterally [No Accessory Muscle Use] : no accessory muscle use [Regular Rhythm] : with a regular rhythm [No Carotid Bruits] : no carotid bruits [Normal S1, S2] : normal S1 and S2 [No Abdominal Bruit] : a ~M bruit was not heard ~T in the abdomen [No Varicosities] : no varicosities [No Palpable Aorta] : no palpable aorta [Pedal Pulses Present] : the pedal pulses are present [No Edema] : there was no peripheral edema [Soft] : abdomen soft [No Extremity Clubbing/Cyanosis] : no extremity clubbing/cyanosis [Non-distended] : non-distended [Non Tender] : non-tender [No Masses] : no abdominal mass palpated [No HSM] : no HSM [Normal Bowel Sounds] : normal bowel sounds [Normal Posterior Cervical Nodes] : no posterior cervical lymphadenopathy [Normal Anterior Cervical Nodes] : no anterior cervical lymphadenopathy [No CVA Tenderness] : no CVA  tenderness [No Spinal Tenderness] : no spinal tenderness [No Joint Swelling] : no joint swelling [Grossly Normal Strength/Tone] : grossly normal strength/tone [No Rash] : no rash [Coordination Grossly Intact] : coordination grossly intact [No Focal Deficits] : no focal deficits [Normal Affect] : the affect was normal [Normal Gait] : normal gait [Normal Insight/Judgement] : insight and judgment were intact [de-identified] : grade 2 systolic murur.

## 2020-08-28 NOTE — HISTORY OF PRESENT ILLNESS
[FreeTextEntry1] : annual [de-identified] : Ms. ANGE MCLEAN is a 45 year old female presenting for an annual\par Hx of Sarcoid recent CT chest\par Hx of Gestational DM in 2 pregnancies was on insulin. Seen by cardiologist 1/20\par Echo done 2019.\par Asthma mild intermittent\par Hx of pain in the right hand and Hip has seen the rheumatologist, had discussed medications. Deferred.\par

## 2020-08-28 NOTE — ASSESSMENT
[FreeTextEntry1] : Annual\par Hx of Gestational Diabetes, FHX of heart dz. Father had CAD in his 30s.\par Seen by Cardiologist 1/20\par Flu vaccine administered\par Fecal occult Rx\par Mammogram 8/20\par Pap UTD\par Check Comprehensive labs\par Asthma mild intermittent\par Hx of Sarcoid recent CT chest

## 2020-09-04 ENCOUNTER — TRANSCRIPTION ENCOUNTER (OUTPATIENT)
Age: 45
End: 2020-09-04

## 2020-09-04 LAB
25(OH)D3 SERPL-MCNC: 31.7 NG/ML
ALBUMIN SERPL ELPH-MCNC: 4.5 G/DL
ALP BLD-CCNC: 56 U/L
ALT SERPL-CCNC: 16 U/L
ANION GAP SERPL CALC-SCNC: 13 MMOL/L
APPEARANCE: CLEAR
AST SERPL-CCNC: 29 U/L
BASOPHILS # BLD AUTO: 0.03 K/UL
BASOPHILS NFR BLD AUTO: 0.8 %
BILIRUB SERPL-MCNC: 1.3 MG/DL
BILIRUBIN URINE: NEGATIVE
BLOOD URINE: NEGATIVE
BUN SERPL-MCNC: 14 MG/DL
CALCIUM SERPL-MCNC: 9.4 MG/DL
CHLORIDE SERPL-SCNC: 104 MMOL/L
CHOLEST SERPL-MCNC: 243 MG/DL
CHOLEST/HDLC SERPL: 3.7 RATIO
CO2 SERPL-SCNC: 23 MMOL/L
COLOR: NORMAL
CREAT SERPL-MCNC: 0.78 MG/DL
EOSINOPHIL # BLD AUTO: 0.17 K/UL
EOSINOPHIL NFR BLD AUTO: 4.8 %
ESTIMATED AVERAGE GLUCOSE: 100 MG/DL
FOLATE SERPL-MCNC: 13.2 NG/ML
GLUCOSE QUALITATIVE U: NEGATIVE
GLUCOSE SERPL-MCNC: 104 MG/DL
HBA1C MFR BLD HPLC: 5.1 %
HCT VFR BLD CALC: 43.4 %
HDLC SERPL-MCNC: 66 MG/DL
HGB BLD-MCNC: 14.5 G/DL
IMM GRANULOCYTES NFR BLD AUTO: 0 %
KETONES URINE: NEGATIVE
LDLC SERPL CALC-MCNC: 162 MG/DL
LEUKOCYTE ESTERASE URINE: NEGATIVE
LYMPHOCYTES # BLD AUTO: 0.95 K/UL
LYMPHOCYTES NFR BLD AUTO: 26.7 %
MAGNESIUM SERPL-MCNC: 2.1 MG/DL
MAN DIFF?: NORMAL
MCHC RBC-ENTMCNC: 31.8 PG
MCHC RBC-ENTMCNC: 33.4 GM/DL
MCV RBC AUTO: 95.2 FL
MONOCYTES # BLD AUTO: 0.43 K/UL
MONOCYTES NFR BLD AUTO: 12.1 %
NEUTROPHILS # BLD AUTO: 1.98 K/UL
NEUTROPHILS NFR BLD AUTO: 55.6 %
NITRITE URINE: NEGATIVE
PH URINE: 6
PLATELET # BLD AUTO: 245 K/UL
POTASSIUM SERPL-SCNC: 4.5 MMOL/L
PROT SERPL-MCNC: 7.1 G/DL
PROTEIN URINE: NORMAL
RBC # BLD: 4.56 M/UL
RBC # FLD: 12.2 %
SODIUM SERPL-SCNC: 140 MMOL/L
SPECIFIC GRAVITY URINE: 1.02
T4 FREE SERPL-MCNC: 1.3 NG/DL
TRIGL SERPL-MCNC: 72 MG/DL
TSH SERPL-ACNC: 1.23 UIU/ML
URATE SERPL-MCNC: 4.5 MG/DL
UROBILINOGEN URINE: NORMAL
VIT B12 SERPL-MCNC: 551 PG/ML
WBC # FLD AUTO: 3.56 K/UL

## 2020-09-08 ENCOUNTER — TRANSCRIPTION ENCOUNTER (OUTPATIENT)
Age: 45
End: 2020-09-08

## 2020-10-08 ENCOUNTER — TRANSCRIPTION ENCOUNTER (OUTPATIENT)
Age: 45
End: 2020-10-08

## 2020-10-08 DIAGNOSIS — N95.1 MENOPAUSAL AND FEMALE CLIMACTERIC STATES: ICD-10-CM

## 2020-11-20 ENCOUNTER — TRANSCRIPTION ENCOUNTER (OUTPATIENT)
Age: 45
End: 2020-11-20

## 2020-11-20 LAB
CHOLEST SERPL-MCNC: 190 MG/DL
FSH SERPL-MCNC: 121 IU/L
HDLC SERPL-MCNC: 70 MG/DL
LDLC SERPL CALC-MCNC: 95 MG/DL
LH SERPL-ACNC: 51.8 IU/L
NONHDLC SERPL-MCNC: 119 MG/DL
TRIGL SERPL-MCNC: 124 MG/DL

## 2020-11-23 ENCOUNTER — TRANSCRIPTION ENCOUNTER (OUTPATIENT)
Age: 45
End: 2020-11-23

## 2020-11-23 ENCOUNTER — OUTPATIENT (OUTPATIENT)
Dept: OUTPATIENT SERVICES | Facility: HOSPITAL | Age: 45
LOS: 1 days | End: 2020-11-23
Payer: COMMERCIAL

## 2020-11-23 DIAGNOSIS — K08.409 PARTIAL LOSS OF TEETH, UNSPECIFIED CAUSE, UNSPECIFIED CLASS: Chronic | ICD-10-CM

## 2020-11-23 DIAGNOSIS — Z11.59 ENCOUNTER FOR SCREENING FOR OTHER VIRAL DISEASES: ICD-10-CM

## 2020-11-23 DIAGNOSIS — Z98.890 OTHER SPECIFIED POSTPROCEDURAL STATES: Chronic | ICD-10-CM

## 2020-11-23 LAB
ESTROGEN SERPL-MCNC: 73 PG/ML
SARS-COV-2 RNA SPEC QL NAA+PROBE: SIGNIFICANT CHANGE UP

## 2020-11-23 PROCEDURE — U0003: CPT

## 2020-11-24 DIAGNOSIS — Z11.59 ENCOUNTER FOR SCREENING FOR OTHER VIRAL DISEASES: ICD-10-CM

## 2020-12-01 ENCOUNTER — EMERGENCY (EMERGENCY)
Facility: HOSPITAL | Age: 45
LOS: 0 days | Discharge: HOME | End: 2020-12-01
Attending: EMERGENCY MEDICINE
Payer: COMMERCIAL

## 2020-12-01 VITALS
OXYGEN SATURATION: 99 % | DIASTOLIC BLOOD PRESSURE: 94 MMHG | HEART RATE: 87 BPM | WEIGHT: 154.98 LBS | HEIGHT: 72 IN | TEMPERATURE: 99 F | RESPIRATION RATE: 18 BRPM | SYSTOLIC BLOOD PRESSURE: 131 MMHG

## 2020-12-01 VITALS
SYSTOLIC BLOOD PRESSURE: 123 MMHG | HEART RATE: 80 BPM | RESPIRATION RATE: 17 BRPM | OXYGEN SATURATION: 100 % | DIASTOLIC BLOOD PRESSURE: 74 MMHG

## 2020-12-01 DIAGNOSIS — J45.909 UNSPECIFIED ASTHMA, UNCOMPLICATED: ICD-10-CM

## 2020-12-01 DIAGNOSIS — Z98.890 OTHER SPECIFIED POSTPROCEDURAL STATES: Chronic | ICD-10-CM

## 2020-12-01 DIAGNOSIS — K08.409 PARTIAL LOSS OF TEETH, UNSPECIFIED CAUSE, UNSPECIFIED CLASS: Chronic | ICD-10-CM

## 2020-12-01 DIAGNOSIS — Z91.040 LATEX ALLERGY STATUS: ICD-10-CM

## 2020-12-01 DIAGNOSIS — E78.5 HYPERLIPIDEMIA, UNSPECIFIED: ICD-10-CM

## 2020-12-01 DIAGNOSIS — M54.5 LOW BACK PAIN: ICD-10-CM

## 2020-12-01 DIAGNOSIS — Z88.8 ALLERGY STATUS TO OTHER DRUGS, MEDICAMENTS AND BIOLOGICAL SUBSTANCES: ICD-10-CM
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
## 2020-12-01 DIAGNOSIS — S39.92XA UNSPECIFIED INJURY OF LOWER BACK, INITIAL ENCOUNTER: ICD-10-CM

## 2020-12-01 LAB — HCG SERPL-ACNC: 2 MIU/ML — SIGNIFICANT CHANGE UP

## 2020-12-01 PROCEDURE — 99284 EMERGENCY DEPT VISIT MOD MDM: CPT

## 2020-12-01 PROCEDURE — 72100 X-RAY EXAM L-S SPINE 2/3 VWS: CPT | Mod: 26

## 2020-12-01 PROCEDURE — 72100 X-RAY EXAM L-S SPINE 2/3 VWS: CPT

## 2020-12-01 PROCEDURE — 96375 TX/PRO/DX INJ NEW DRUG ADDON: CPT

## 2020-12-01 PROCEDURE — 99284 EMERGENCY DEPT VISIT MOD MDM: CPT | Mod: 25

## 2020-12-01 PROCEDURE — 96374 THER/PROPH/DIAG INJ IV PUSH: CPT

## 2020-12-01 PROCEDURE — 84702 CHORIONIC GONADOTROPIN TEST: CPT

## 2020-12-01 PROCEDURE — 36415 COLL VENOUS BLD VENIPUNCTURE: CPT

## 2020-12-01 RX ORDER — IBUPROFEN 200 MG
1 TABLET ORAL
Qty: 20 | Refills: 0
Start: 2020-12-01

## 2020-12-01 RX ORDER — DIAZEPAM 5 MG
5 TABLET ORAL ONCE
Refills: 0 | Status: DISCONTINUED | OUTPATIENT
Start: 2020-12-01 | End: 2020-12-01

## 2020-12-01 RX ORDER — ONDANSETRON 8 MG/1
4 TABLET, FILM COATED ORAL ONCE
Refills: 0 | Status: COMPLETED | OUTPATIENT
Start: 2020-12-01 | End: 2020-12-01

## 2020-12-01 RX ORDER — SODIUM CHLORIDE 9 MG/ML
1000 INJECTION INTRAMUSCULAR; INTRAVENOUS; SUBCUTANEOUS ONCE
Refills: 0 | Status: COMPLETED | OUTPATIENT
Start: 2020-12-01 | End: 2020-12-01

## 2020-12-01 RX ORDER — MORPHINE SULFATE 50 MG/1
4 CAPSULE, EXTENDED RELEASE ORAL ONCE
Refills: 0 | Status: DISCONTINUED | OUTPATIENT
Start: 2020-12-01 | End: 2020-12-01

## 2020-12-01 RX ORDER — CYCLOBENZAPRINE HYDROCHLORIDE 10 MG/1
1 TABLET, FILM COATED ORAL
Qty: 15 | Refills: 0
Start: 2020-12-01

## 2020-12-01 RX ADMIN — Medication 5 MILLIGRAM(S): at 12:02

## 2020-12-01 RX ADMIN — MORPHINE SULFATE 4 MILLIGRAM(S): 50 CAPSULE, EXTENDED RELEASE ORAL at 12:03

## 2020-12-01 RX ADMIN — ONDANSETRON 4 MILLIGRAM(S): 8 TABLET, FILM COATED ORAL at 12:05

## 2020-12-01 RX ADMIN — Medication 125 MILLIGRAM(S): at 12:03

## 2020-12-01 RX ADMIN — SODIUM CHLORIDE 1000 MILLILITER(S): 9 INJECTION INTRAMUSCULAR; INTRAVENOUS; SUBCUTANEOUS at 12:02

## 2020-12-01 NOTE — ED STATDOCS - PATIENT PORTAL LINK FT
You can access the FollowMyHealth Patient Portal offered by [facility] by registering at the following website: [email or web address]. By joining [facility]’s FollowMyHealth portal, you will also be able to view your health information using other applications (apps) compatible with our system.

## 2020-12-01 NOTE — ED STATDOCS - OBJECTIVE STATEMENT
46 y/o female with a PMHx of gestational diabetes, HLD, thyroid nodule, asthma, presents to the ED s/p back injury 30 minutes PTA. Pt reports she lifted a heavy pot in her yard and felt a sudden onset back pain and large cracking sound in lower back. Pt reports back pain, worsening with movement. Denies any other symptoms. No chance of pregnancy, LMP 1 year ago. No other complaints at this time. PCP: Saida Lou.

## 2020-12-01 NOTE — ED ADULT TRIAGE NOTE - CHIEF COMPLAINT QUOTE
pt states she was doing heavy lifting, heard a large cracking sound and now has low abck pain. pt denies hx of back  issues or pain. pt states she can not sit or lay and has pain with ambyualtion.

## 2020-12-01 NOTE — ED STATDOCS - CARE PROVIDER_API CALL
Lynette Parikh  ORTHOPAEDIC SURGERY  [address], [address]nd [place] [address]  Phone: [phone]  Fax: [phone]  Follow Up Time:

## 2020-12-01 NOTE — ED STATDOCS - PROGRESS NOTE DETAILS
signed Sara Gonsalez PA-C Pt seen initially in intake by Dr Rose.   45F c/o low back pain and felt cracking in her low back when she lifted a heavy pot of soil this morning. Pt developed severe pain. Pt denies fever/chills, numbness, lower extremity weakness, saddle anesthesia,  or incontinence. pt was initially in obvious significant pain, is now feeling better. No significant findings on xray. Will DC with rx motrin, flexeril, percocet. f/u with spine or pts own ortho Dr Manuel. return precautions given. Pt feeling well at DC, agrees with DC and plan of care.

## 2020-12-01 NOTE — ED STATDOCS - NSFOLLOWUPINSTRUCTIONS_ED_ALL_ED_FT
Back Pain    WHAT YOU NEED TO KNOW:    Back pain is common. It can be caused by many conditions, such as arthritis or the breakdown of spinal discs. Your risk for back pain is increased by injuries, lack of activity, or repeated bending and twisting. You may feel sore or stiff on one or both sides of your back. The pain may spread to your buttocks or thighs.    DISCHARGE INSTRUCTIONS:    Return to the emergency department if:     You have pain, numbness, or weakness in one or both legs.      Your pain becomes so severe that you cannot walk.      You cannot control your urine or bowel movements.      You have severe back pain with chest pain.      You have severe back pain, nausea, and vomiting.      You have severe back pain that spreads to your side or genital area.    Contact your healthcare provider if:     You have back pain that does not get better with rest and pain medicine.      You have a fever.      You have pain that worsens when you are on your back or when you rest.      You have pain that worsens when you cough or sneeze.      You lose weight without trying.      You have questions or concerns about your condition or care.    Medicines:     NSAIDs help decrease swelling and pain. This medicine is available with or without a doctor's order. NSAIDs can cause stomach bleeding or kidney problems in certain people. If you take blood thinner medicine, always ask your healthcare provider if NSAIDs are safe for you. Always read the medicine label and follow directions.      Acetaminophen decreases pain and fever. It is available without a doctor's order. Ask how much to take and how often to take it. Follow directions. Read the labels of all other medicines you are using to see if they also contain acetaminophen, or ask your doctor or pharmacist. Acetaminophen can cause liver damage if not taken correctly. Do not use more than 4 grams (4,000 milligrams) total of acetaminophen in one day.       Muscle relaxers help decrease muscle spasms and back pain.      Prescription pain medicine may be given. Ask your healthcare provider how to take this medicine safely. Some prescription pain medicines contain acetaminophen. Do not take other medicines that contain acetaminophen without talking to your healthcare provider. Too much acetaminophen may cause liver damage. Prescription pain medicine may cause constipation. Ask your healthcare provider how to prevent or treat constipation.       Take your medicine as directed. Contact your healthcare provider if you think your medicine is not helping or if you have side effects. Tell him or her if you are allergic to any medicine. Keep a list of the medicines, vitamins, and herbs you take. Include the amounts, and when and why you take them. Bring the list or the pill bottles to follow-up visits. Carry your medicine list with you in case of an emergency.    How to manage your back pain:     Apply ice on your back for 15 to 20 minutes every hour or as directed. Use an ice pack, or put crushed ice in a plastic bag. Cover it with a towel before you apply it to your skin. Ice helps prevent tissue damage and decreases pain.      Apply heat on your back for 20 to 30 minutes every 2 hours for as many days as directed. Heat helps decrease pain and muscle spasms.      Stay active as much as you can without causing more pain. Bed rest could make your back pain worse. Avoid heavy lifting until your pain is gone.      Go to physical therapy as directed. A physical therapist can teach you exercises to help improve movement and strength, and to decrease pain.    Follow up with your healthcare provider in 2 weeks, or as directed: Write down your questions so you remember to ask them during your visits.     FOLLOW UP WITH A SPINE DOCTOR OR YOUR ORTHOPEDIC DOCTOR THIS WEEK. CALL THE OFFICE TO MAKE AN APPOINTMENT. RETURN TO ER FOR ANY WORSENING SYMPTOMS OR NEW CONCERNS.

## 2020-12-01 NOTE — ED ADULT NURSE NOTE - NSIMPLEMENTINTERV_GEN_ALL_ED
Implemented All Universal Safety Interventions:  [place] to call system. Call bell, personal items and telephone within reach. Instruct patient to call for assistance. Room bathroom lighting operational. Non-slip footwear when patient is off stretcher. Physically safe environment: no spills, clutter or unnecessary equipment. Stretcher in lowest position, wheels locked, appropriate side rails in place.

## 2020-12-09 ENCOUNTER — TRANSCRIPTION ENCOUNTER (OUTPATIENT)
Age: 45
End: 2020-12-09

## 2020-12-16 PROBLEM — Z86.69 HISTORY OF CONJUNCTIVITIS: Status: RESOLVED | Noted: 2018-07-30 | Resolved: 2020-12-16

## 2020-12-21 ENCOUNTER — TRANSCRIPTION ENCOUNTER (OUTPATIENT)
Age: 45
End: 2020-12-21

## 2020-12-21 PROBLEM — Z86.69 HISTORY OF OTITIS MEDIA: Status: RESOLVED | Noted: 2019-11-26 | Resolved: 2020-12-21

## 2020-12-23 PROBLEM — Z01.419 ENCOUNTER FOR ANNUAL ROUTINE GYNECOLOGICAL EXAMINATION: Status: RESOLVED | Noted: 2020-03-06 | Resolved: 2020-12-23

## 2021-02-07 ENCOUNTER — TRANSCRIPTION ENCOUNTER (OUTPATIENT)
Age: 46
End: 2021-02-07

## 2021-02-10 ENCOUNTER — APPOINTMENT (OUTPATIENT)
Dept: OBGYN | Facility: CLINIC | Age: 46
End: 2021-02-10
Payer: COMMERCIAL

## 2021-02-10 VITALS
DIASTOLIC BLOOD PRESSURE: 64 MMHG | WEIGHT: 160 LBS | HEIGHT: 63 IN | OXYGEN SATURATION: 97 % | HEART RATE: 72 BPM | BODY MASS INDEX: 28.35 KG/M2 | SYSTOLIC BLOOD PRESSURE: 194 MMHG | RESPIRATION RATE: 16 BRPM

## 2021-02-10 DIAGNOSIS — N64.4 MASTODYNIA: ICD-10-CM

## 2021-02-10 DIAGNOSIS — N92.6 IRREGULAR MENSTRUATION, UNSPECIFIED: ICD-10-CM

## 2021-02-10 PROCEDURE — 99213 OFFICE O/P EST LOW 20 MIN: CPT

## 2021-02-10 PROCEDURE — 99072 ADDL SUPL MATRL&STAF TM PHE: CPT

## 2021-02-10 NOTE — DISCUSSION/SUMMARY
[FreeTextEntry1] : 1) I reviewed her 11/2020 serum hormone levels from her PCP which are consistent with menopause, however given that she had some staining 11/2020 she does not fit the diagnosis of menopausal as she must be without a menses for one year.  We reviewed normal vs. abnormal bleeding patterns during this time\par 2) CBE was normal, Rx for imaging issued. Pt states she usually has a b/l breast sono with screening mammo 2nd to history of fiborcystic breasts but did not back in August. Rx for diagnostic right breast mammo along with b/l breast sono issued\par \par Return to office for annual exam >3/6/21\par \par 25 minutes were spent face to face in this visit with greater than 50% of the time spent counseling\par

## 2021-02-10 NOTE — HISTORY OF PRESENT ILLNESS
[FreeTextEntry1] : Rosibel is a 44 y/o   who presents today with no menses for over one year. She states she did experience 3 days of brown spotting 2020.  Hormone testing performed by her PCP on 2020 notes an FSH level of 121, LH of 51.8 and an Estrogen level of 73.  \par \par She denies any hot flashes but reports new onset of dyspareunia because of vaginal dryness.  She states her mother experienced an earlier onset of menopause.\par \par She also reports right breast pain x1 week. She denies increased caffeine consumption.\par \par she is a sono tech at  and

## 2021-02-10 NOTE — PHYSICAL EXAM
[Appropriately responsive] : appropriately responsive [Alert] : alert [No Acute Distress] : no acute distress [No Lymphadenopathy] : no lymphadenopathy [Examination Of The Breasts] : a normal appearance [Normal] : normal [No Masses] : no breast masses were palpable [FreeTextEntry6] : 3rd nipple to left chest wall

## 2021-03-24 ENCOUNTER — APPOINTMENT (OUTPATIENT)
Dept: OBGYN | Facility: CLINIC | Age: 46
End: 2021-03-24

## 2021-07-13 ENCOUNTER — APPOINTMENT (OUTPATIENT)
Dept: OBGYN | Facility: CLINIC | Age: 46
End: 2021-07-13
Payer: COMMERCIAL

## 2021-07-13 VITALS
TEMPERATURE: 97.9 F | WEIGHT: 160 LBS | HEART RATE: 74 BPM | HEIGHT: 63 IN | RESPIRATION RATE: 15 BRPM | BODY MASS INDEX: 28.35 KG/M2 | SYSTOLIC BLOOD PRESSURE: 110 MMHG | DIASTOLIC BLOOD PRESSURE: 78 MMHG

## 2021-07-13 DIAGNOSIS — Z13.71 ENCOUNTER FOR NONPROCREATIVE SCREENING FOR GENETIC DISEASE CARRIER STATUS: ICD-10-CM

## 2021-07-13 DIAGNOSIS — N60.19 DIFFUSE CYSTIC MASTOPATHY OF UNSPECIFIED BREAST: ICD-10-CM

## 2021-07-13 DIAGNOSIS — Z80.3 FAMILY HISTORY OF MALIGNANT NEOPLASM OF BREAST: ICD-10-CM

## 2021-07-13 DIAGNOSIS — Z12.4 ENCOUNTER FOR SCREENING FOR MALIGNANT NEOPLASM OF CERVIX: ICD-10-CM

## 2021-07-13 PROCEDURE — 99396 PREV VISIT EST AGE 40-64: CPT

## 2021-07-13 PROCEDURE — 99072 ADDL SUPL MATRL&STAF TM PHE: CPT

## 2021-07-13 RX ORDER — TRIAMCINOLONE ACETONIDE 1 MG/G
0.1 CREAM TOPICAL
Qty: 30 | Refills: 0 | Status: DISCONTINUED | COMMUNITY
Start: 2019-03-25 | End: 2021-07-13

## 2021-07-13 NOTE — DISCUSSION/SUMMARY
[FreeTextEntry1] : 1) rX for screening mammogram and sonogram issued\par 2) pap performed\par \par return to office in one year, sooner prn

## 2021-07-13 NOTE — HISTORY OF PRESENT ILLNESS
[Currently Active] : currently active [Men] : men [TextBox_4] : Alayna is a 47 y/o  who presents today for an annual exam. She had last presented to this office on 2/10/21 with c/o breast pain x1 week and was issued a diagnostic mammo and sono but as the pain resolved spontaneously, she did not follow up. She reports a history of fibrocystic breasts.  She requests mammo and sono today.\par \par She has had a colonoscopy >3yrs ago. She was advised not to return until the age of 49.\par \par Her LMP was 19\par \par She is a sono tech at . [BoneDensityDate] : 5/14/20 [FreeTextEntry1] : 12/2020 [FreeTextEntry3] : vasectomy

## 2021-07-14 LAB — HPV HIGH+LOW RISK DNA PNL CVX: NOT DETECTED

## 2021-07-27 ENCOUNTER — TRANSCRIPTION ENCOUNTER (OUTPATIENT)
Age: 46
End: 2021-07-27

## 2021-08-10 ENCOUNTER — APPOINTMENT (OUTPATIENT)
Dept: OBGYN | Facility: CLINIC | Age: 46
End: 2021-08-10
Payer: COMMERCIAL

## 2021-08-10 DIAGNOSIS — Z13.79 ENCOUNTER FOR OTHER SCREENING FOR GENETIC AND CHROMOSOMAL ANOMALIES: ICD-10-CM

## 2021-08-10 DIAGNOSIS — Z80.0 FAMILY HISTORY OF MALIGNANT NEOPLASM OF DIGESTIVE ORGANS: ICD-10-CM

## 2021-08-10 PROCEDURE — 99214 OFFICE O/P EST MOD 30 MIN: CPT

## 2021-08-10 NOTE — DISCUSSION/SUMMARY
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

## 2021-08-10 NOTE — HISTORY OF PRESENT ILLNESS
[FreeTextEntry1] : Alayna is a 47 y/o  who presents today for [facility] testing as her sister was recently diagnosed with Sanchez Syndrome at age 44.  Her sister does not have cancer.\par \par Pt is s/p a screening colonoscopy that was normal about 3 years ago.\par \par She previously has had BRCA testing which was negative.

## 2021-08-18 ENCOUNTER — TRANSCRIPTION ENCOUNTER (OUTPATIENT)
Age: 46
End: 2021-08-18

## 2021-08-24 ENCOUNTER — NON-APPOINTMENT (OUTPATIENT)
Age: 46
End: 2021-08-24

## 2021-08-24 ENCOUNTER — TRANSCRIPTION ENCOUNTER (OUTPATIENT)
Age: 46
End: 2021-08-24

## 2021-09-01 NOTE — ASU DISCHARGE PLAN (ADULT/PEDIATRIC) - B. DRINK ALCOHOL, BEER, OR WINE
Department of Podiatric Surgery  Progress Note      CHIEF COMPLAINT:  Left foot infection    HISTORY OF PRESENT ILLNESS:                The patient is a 60 y.o. male s/p 1 day emergent I&D for gas gangrene in the left foot. He reports feeling feverish today. Otherwise resting comfortably. Past Medical History:        Diagnosis Date    Arthritis     Chronic low back pain     Dental bridge present     upper    Diabetes ([address]     Diabetes mellitus ([address]     Emphysema of lung ([address]     Fractures     Hypertension     Neuropathy     Osteoarthritis     Prostate troubles      Past Surgical History:        Procedure Laterality Date    FOOT SURGERY      HAND SURGERY Left 06/19/2017    LEFT HAND DEBRIDEMENT INCISION AND DRAINAGE    NECK SURGERY      30s year    TOE AMPUTATION Left 2009    WRIST FRACTURE SURGERY       Current Medications:    Current Facility-Administered Medications: acetaminophen (TYLENOL) tablet 650 mg, 650 mg, Oral, Q4H PRN  cefepime (MAXIPIME) 2000 mg IVPB minibag, 2,000 mg, IntraVENous, Q12H  clindamycin (CLEOCIN) 600 mg in dextrose 5 % 50 mL IVPB, 600 mg, IntraVENous, Q8H  vancomycin (VANCOCIN) intermittent dosing (placeholder), , Other, RX Placeholder  oxyCODONE (ROXICODONE) immediate release tablet 5 mg, 5 mg, Oral, Q4H PRN  oxyCODONE (ROXICODONE) immediate release tablet 10 mg, 10 mg, Oral, Q4H PRN  benzocaine-menthol (CEPACOL SORE THROAT) lozenge 1 lozenge, 1 lozenge, Oral, Q2H PRN  atorvastatin (LIPITOR) tablet 40 mg, 40 mg, Oral, Daily  insulin glargine (LANTUS) injection vial 20 Units, 20 Units, SubCUTAneous, Nightly  tamsulosin (FLOMAX) capsule 0.4 mg, 0.4 mg, Oral, Daily  Allergies:   Patient has no known allergies.   Family History:       Problem Relation Age of Onset    Heart Failure Mother     Diabetes Mother     Cancer Father         throat cancer    Asthma Daughter         Sports induced as a child    Emphysema Neg Hx     Hypertension Neg Hx      REVIEW OF SYSTEMS: CONSTITUTIONAL:  positive for  fevers and chills  RESPIRATORY:  negative for  dry cough and wheezing  PHYSICAL EXAM:      Vitals:    BP (!) 160/70   Pulse 86   Temp 99.7 °F (37.6 °C) (Oral)   Resp 16   Ht 6' 1\" (1.854 m)   Wt 240 lb (108.9 kg)   SpO2 95%   BMI 31.66 kg/m²     LABS:   Recent Labs     08/30/21 1938 08/31/21  1449   WBC 17.4* 19.2*   HGB 11.7* 12.3*   HCT 34.5* 36.3*    224     Recent Labs     08/31/21  1520      K 4.9      CO2 19*   BUN 42*   CREATININE 2.0*     Recent Labs     08/30/21  1938   PROT 7.4       LOWER EXTREMITY EXAMINATION   SKIN:      Open surgical incision along the 1st ray done to bone and over the 4th metatarsal shaft. Deep tissue is not bleeding well, and is thickened and unhealthy in appearance. No active purulence expressed today. Erythema to the dorsal left foot mildly improved in intensity. No pain. Mild malodor still appreciated. Hemorrhagic callus plantar medial right hallux. NEUROLOGIC:      Protective sensation is absent at the level of the toes. VASCULAR:      DP and PT pulses are faintly palpable left foot. Could be related to current edema. CFT brisk to the toes. MUSCULOSKELETAL:    Muscle strength 5/5 all prime movers b/l lower extremity. Left hallux amputation. Imaging:    Soft tissue swelling with soft tissue gas adjacent to the head of the 1st   metatarsal.  No convincing plain film evidence of osteomyelitis.       No acute fracture identified.  Probable nonacute corner fracture involving   the base of the 2nd proximal phalanx. Assessment:    1. Gas gangrene, left foot  2. DM2 with ulcer  3. DM2 with neuropathy    Plan:    - E&M. S/P 1 day emergent I&D. - Patient with severe diabetic foot infection and gas gangrene on x-ray. Ascending erythema towards the ankle. - WBC 19,000 on admission. Needs need labs.  Febrile this morning, 100.5.   - IV ABX; IV vancomycin, clindamycin, and cefepime    - Consult placed to ID.  - Arterial duplex and MRI pending.   - Patient will need 2nd surgery. MRI to help plan. - New foot and tib/fib x-rays ordered. - New sterile dressing placed. - All questions answered.       Darryl Samayoa, [address] [place] [address] or [facility] Statement Selected

## 2021-09-03 ENCOUNTER — TRANSCRIPTION ENCOUNTER (OUTPATIENT)
Age: 46
End: 2021-09-03

## 2021-09-03 RX ORDER — ALBUTEROL SULFATE 90 UG/1
108 (90 BASE) INHALANT RESPIRATORY (INHALATION)
Qty: 18 | Refills: 0 | Status: ACTIVE | COMMUNITY
Start: 2020-05-11 | End: 1900-01-01

## 2021-09-09 ENCOUNTER — NON-APPOINTMENT (OUTPATIENT)
Age: 46
End: 2021-09-09

## 2021-09-29 ENCOUNTER — NON-APPOINTMENT (OUTPATIENT)
Age: 46
End: 2021-09-29

## 2021-09-29 ENCOUNTER — APPOINTMENT (OUTPATIENT)
Dept: FAMILY MEDICINE | Facility: CLINIC | Age: 46
End: 2021-09-29
Payer: COMMERCIAL

## 2021-09-29 VITALS
BODY MASS INDEX: 28.88 KG/M2 | TEMPERATURE: 98.3 F | HEART RATE: 83 BPM | RESPIRATION RATE: 16 BRPM | OXYGEN SATURATION: 97 % | DIASTOLIC BLOOD PRESSURE: 70 MMHG | WEIGHT: 163 LBS | SYSTOLIC BLOOD PRESSURE: 124 MMHG | HEIGHT: 63 IN

## 2021-09-29 DIAGNOSIS — Z13.31 ENCOUNTER FOR SCREENING FOR DEPRESSION: ICD-10-CM

## 2021-09-29 DIAGNOSIS — E78.5 HYPERLIPIDEMIA, UNSPECIFIED: ICD-10-CM

## 2021-09-29 DIAGNOSIS — Z13.39 ENCOUNTER FOR SCREENING EXAM FOR OTHER MENTAL HEALTH AND BEHAVIORAL DISORDERS: ICD-10-CM

## 2021-09-29 DIAGNOSIS — Z00.00 ENCOUNTER FOR GENERAL ADULT MEDICAL EXAMINATION W/OUT ABNORMAL FINDINGS: ICD-10-CM

## 2021-09-29 DIAGNOSIS — O24.414 GESTATIONAL DIABETES MELLITUS IN PREGNANCY, INSULIN CONTROLLED: ICD-10-CM

## 2021-09-29 DIAGNOSIS — I31.3 PERICARDIAL EFFUSION (NONINFLAMMATORY): ICD-10-CM

## 2021-09-29 PROCEDURE — G0442 ANNUAL ALCOHOL SCREEN 15 MIN: CPT | Mod: NC

## 2021-09-29 PROCEDURE — 90686 IIV4 VACC NO PRSV 0.5 ML IM: CPT

## 2021-09-29 PROCEDURE — 36415 COLL VENOUS BLD VENIPUNCTURE: CPT

## 2021-09-29 PROCEDURE — 99396 PREV VISIT EST AGE 40-64: CPT | Mod: 25

## 2021-09-29 PROCEDURE — G0008: CPT

## 2021-09-29 PROCEDURE — 93000 ELECTROCARDIOGRAM COMPLETE: CPT | Mod: 59

## 2021-09-29 PROCEDURE — G0444 DEPRESSION SCREEN ANNUAL: CPT | Mod: NC,59

## 2021-09-29 RX ORDER — EPINEPHRINE 0.3 MG/.3ML
0.3 INJECTION INTRAMUSCULAR
Qty: 1 | Refills: 2 | Status: ACTIVE | COMMUNITY
Start: 2019-03-25 | End: 1900-01-01

## 2021-09-29 NOTE — PHYSICAL EXAM
[Well Nourished] : well nourished [Well Developed] : well developed [Well-Appearing] : well-appearing [Normal Sclera/Conjunctiva] : normal sclera/conjunctiva [PERRL] : pupils equal round and reactive to light [EOMI] : extraocular movements intact [Normal Outer Ear/Nose] : the outer ears and nose were normal in appearance [Normal Oropharynx] : the oropharynx was normal [No JVD] : no jugular venous distention [No Lymphadenopathy] : no lymphadenopathy [Supple] : supple [Thyroid Normal, No Nodules] : the thyroid was normal and there were no nodules present [No Respiratory Distress] : no respiratory distress  [No Accessory Muscle Use] : no accessory muscle use [Clear to Auscultation] : lungs were clear to auscultation bilaterally [Normal Rate] : normal rate  [Regular Rhythm] : with a regular rhythm [Normal S1, S2] : normal S1 and S2 [No Carotid Bruits] : no carotid bruits [No Abdominal Bruit] : a ~M bruit was not heard ~T in the abdomen [No Varicosities] : no varicosities [Pedal Pulses Present] : the pedal pulses are present [No Edema] : there was no peripheral edema [No Palpable Aorta] : no palpable aorta [No Extremity Clubbing/Cyanosis] : no extremity clubbing/cyanosis [Soft] : abdomen soft [Non Tender] : non-tender [Non-distended] : non-distended [No Masses] : no abdominal mass palpated [Normal Bowel Sounds] : normal bowel sounds [No HSM] : no HSM [Normal Posterior Cervical Nodes] : no posterior cervical lymphadenopathy [Normal Anterior Cervical Nodes] : no anterior cervical lymphadenopathy [No CVA Tenderness] : no CVA  tenderness [No Spinal Tenderness] : no spinal tenderness [No Joint Swelling] : no joint swelling [Grossly Normal Strength/Tone] : grossly normal strength/tone [No Rash] : no rash [Coordination Grossly Intact] : coordination grossly intact [No Focal Deficits] : no focal deficits [Normal Gait] : normal gait [Normal Affect] : the affect was normal [Normal Insight/Judgement] : insight and judgment were intact [de-identified] : grade 2 systolic murur.

## 2021-09-29 NOTE — HEALTH RISK ASSESSMENT
[Good] : ~his/her~  mood as  good [Yes] : Yes [2 - 3 times a week (3 pts)] : 2 - 3  times a week (3 points) [1 or 2 (0 pts)] : 1 or 2 (0 points) [Patient reported mammogram was normal] : Patient reported mammogram was normal [Patient reported PAP Smear was normal] : Patient reported PAP Smear was normal [Patient reported bone density results were normal] : Patient reported bone density results were normal [Patient reported colonoscopy was normal] : Patient reported colonoscopy was normal [None] : None [With Family] : lives with family [Employed] : employed [] :  [Sexually Active] : sexually active [Feels Safe at Home] : Feels safe at home [Smoke Detector] : smoke detector [Carbon Monoxide Detector] : carbon monoxide detector [Seat Belt] :  uses seat belt [Sunscreen] : uses sunscreen [Reviewed no changes] : Reviewed, no changes [Name: ___] : Health Care Proxy's Name: [unfilled]  [Relationship: ___] : Relationship: [unfilled] [Audit-CScore] : 3 [High Risk Behavior] : no high risk behavior [Reports changes in hearing] : Reports no changes in hearing [Reports changes in vision] : Reports no changes in vision [Reports changes in dental health] : Reports no changes in dental health [TB Exposure] : is not being exposed to tuberculosis [MammogramDate] : 8/2021 [PapSmearDate] : 7/2021 [BoneDensityDate] : 5/2020 [BoneDensityComments] : hip -1.0 age matched at 92% [ColonoscopyDate] : 9/2018 [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

## 2021-09-29 NOTE — ASSESSMENT
[FreeTextEntry1] : Annual\par Annual\par SBIRT positive\par Depression screen negative\par Check comprehensive labs, in office today\par Vaccines up to date \par \par Recently diagnosed with Sanchez syn. Sister has it\par Scheduled to see GYN for prophylactic surgery.\par Hx of Sarcoid, CT chest 2020. Has follow up scheduled.\par Hx of Gestational DM in 2 pregnancies was on insulin. Seen by cardiologist 1/20\par Echo done 2019.\par Asthma mild intermittent\par Renewed Breo\par Requesting epi pen, Rx renewed\par Hx of pain in the right hand and Hip has seen the rheumatologist, had discussed medications. Deferred.\par \par EKG NSR no change from Prior\par Hx of Gestational Diabetes, FHX of heart dz. Father had CAD in his 30s.\par Seen by Cardiologist 1/20.\par \par GYN\par pap up to date \par Mammogram 8/21\par Colonoscopy 2018\par \par Flu vaccine administered\par \par \par

## 2021-09-29 NOTE — HISTORY OF PRESENT ILLNESS
[FreeTextEntry1] : annual [de-identified] : Ms. ANGE MCLEAN is a 46 year old female presenting for an annual\par Recently diagnosed with Sanchez syn. Sister has it\par Scheduled to see GYN for prophylactic surgery.\par Hx of Sarcoid, CT chest 2020. Has follow up scheduled.\par Hx of Gestational DM in 2 pregnancies was on insulin. Seen by cardiologist 1/20\par Echo done 2019.\par Asthma mild intermittent\par Hx of pain in the right hand and Hip has seen the rheumatologist, had discussed medications. Deferred.\par

## 2021-10-05 ENCOUNTER — TRANSCRIPTION ENCOUNTER (OUTPATIENT)
Age: 46
End: 2021-10-05

## 2021-10-05 LAB
25(OH)D3 SERPL-MCNC: 39.1 NG/ML
ALBUMIN SERPL ELPH-MCNC: 4.6 G/DL
ALP BLD-CCNC: 71 U/L
ALT SERPL-CCNC: 18 U/L
ANION GAP SERPL CALC-SCNC: 10 MMOL/L
APPEARANCE: CLEAR
AST SERPL-CCNC: 25 U/L
BASOPHILS # BLD AUTO: 0.02 K/UL
BASOPHILS NFR BLD AUTO: 0.6 %
BILIRUB SERPL-MCNC: 1.4 MG/DL
BILIRUBIN URINE: NEGATIVE
BLOOD URINE: NEGATIVE
BUN SERPL-MCNC: 14 MG/DL
CALCIUM SERPL-MCNC: 9.7 MG/DL
CHLORIDE SERPL-SCNC: 101 MMOL/L
CHOLEST SERPL-MCNC: 227 MG/DL
CO2 SERPL-SCNC: 29 MMOL/L
COLOR: YELLOW
CREAT SERPL-MCNC: 0.79 MG/DL
EOSINOPHIL # BLD AUTO: 0.22 K/UL
EOSINOPHIL NFR BLD AUTO: 6.4 %
ESTIMATED AVERAGE GLUCOSE: 105 MG/DL
GLUCOSE QUALITATIVE U: NEGATIVE
GLUCOSE SERPL-MCNC: 94 MG/DL
HBA1C MFR BLD HPLC: 5.3 %
HCT VFR BLD CALC: 43.3 %
HDLC SERPL-MCNC: 69 MG/DL
HGB BLD-MCNC: 14.3 G/DL
IMM GRANULOCYTES NFR BLD AUTO: 0 %
KETONES URINE: NEGATIVE
LDLC SERPL CALC-MCNC: 144 MG/DL
LEUKOCYTE ESTERASE URINE: NEGATIVE
LYMPHOCYTES # BLD AUTO: 0.96 K/UL
LYMPHOCYTES NFR BLD AUTO: 27.7 %
MAN DIFF?: NORMAL
MCHC RBC-ENTMCNC: 31 PG
MCHC RBC-ENTMCNC: 33 GM/DL
MCV RBC AUTO: 93.9 FL
MONOCYTES # BLD AUTO: 0.43 K/UL
MONOCYTES NFR BLD AUTO: 12.4 %
NEUTROPHILS # BLD AUTO: 1.83 K/UL
NEUTROPHILS NFR BLD AUTO: 52.9 %
NITRITE URINE: NEGATIVE
NONHDLC SERPL-MCNC: 159 MG/DL
PH URINE: 7
PLATELET # BLD AUTO: 252 K/UL
POTASSIUM SERPL-SCNC: 4.2 MMOL/L
PROT SERPL-MCNC: 7.2 G/DL
PROTEIN URINE: NEGATIVE
RBC # BLD: 4.61 M/UL
RBC # FLD: 12.5 %
SODIUM SERPL-SCNC: 140 MMOL/L
SPECIFIC GRAVITY URINE: 1.02
T4 FREE SERPL-MCNC: 1.3 NG/DL
TRIGL SERPL-MCNC: 72 MG/DL
TSH SERPL-ACNC: 1.76 UIU/ML
URATE SERPL-MCNC: 4.8 MG/DL
UROBILINOGEN URINE: NORMAL
WBC # FLD AUTO: 3.46 K/UL

## 2021-10-13 ENCOUNTER — TRANSCRIPTION ENCOUNTER (OUTPATIENT)
Age: 46
End: 2021-10-13

## 2021-10-20 ENCOUNTER — APPOINTMENT (OUTPATIENT)
Dept: CARDIOLOGY | Facility: CLINIC | Age: 46
End: 2021-10-20
Payer: COMMERCIAL

## 2021-10-20 ENCOUNTER — NON-APPOINTMENT (OUTPATIENT)
Age: 46
End: 2021-10-20

## 2021-10-20 VITALS
SYSTOLIC BLOOD PRESSURE: 126 MMHG | HEIGHT: 63 IN | BODY MASS INDEX: 28.54 KG/M2 | RESPIRATION RATE: 15 BRPM | DIASTOLIC BLOOD PRESSURE: 87 MMHG | OXYGEN SATURATION: 98 % | HEART RATE: 90 BPM | WEIGHT: 161.06 LBS

## 2021-10-20 DIAGNOSIS — R06.00 DYSPNEA, UNSPECIFIED: ICD-10-CM

## 2021-10-20 PROCEDURE — 93000 ELECTROCARDIOGRAM COMPLETE: CPT

## 2021-10-20 PROCEDURE — 99214 OFFICE O/P EST MOD 30 MIN: CPT

## 2021-10-20 NOTE — PHYSICAL EXAM
[Well Groomed] : well groomed [General Appearance - In No Acute Distress] : no acute distress [Normal Conjunctiva] : the conjunctiva exhibited no abnormalities [Eyelids - No Xanthelasma] : the eyelids demonstrated no xanthelasmas [Normal Oral Mucosa] : normal oral mucosa [No Oral Pallor] : no oral pallor [No Oral Cyanosis] : no oral cyanosis [Normal Jugular Venous A Waves Present] : normal jugular venous A waves present [Normal Jugular Venous V Waves Present] : normal jugular venous V waves present [No Jugular Venous Beavers A Waves] : no jugular venous beavers A waves [Respiration, Rhythm And Depth] : normal respiratory rhythm and effort [Exaggerated Use Of Accessory Muscles For Inspiration] : no accessory muscle use [Auscultation Breath Sounds / Voice Sounds] : lungs were clear to auscultation bilaterally [Abdomen Soft] : soft [Abdomen Tenderness] : non-tender [Abdomen Mass (___ Cm)] : no abdominal mass palpated [Abnormal Walk] : normal gait [Gait - Sufficient For Exercise Testing] : the gait was sufficient for exercise testing [Nail Clubbing] : no clubbing of the fingernails [Cyanosis, Localized] : no localized cyanosis [Petechial Hemorrhages (___cm)] : no petechial hemorrhages [Skin Color & Pigmentation] : normal skin color and pigmentation [] : no rash [No Venous Stasis] : no venous stasis [Skin Lesions] : no skin lesions [No Skin Ulcers] : no skin ulcer [No Xanthoma] : no  xanthoma was observed [Oriented To Time, Place, And Person] : oriented to person, place, and time [Affect] : the affect was normal [Mood] : the mood was normal [No Anxiety] : not feeling anxious [Normal Rate] : normal [Rhythm Regular] : regular [Normal S1] : normal S1 [Normal S2] : normal S2 [No Murmur] : no murmurs heard [Right Carotid Bruit] : no bruit heard over the right carotid [Left Carotid Bruit] : no bruit heard over the left carotid [2+] : left 2+ [Bruit] : no bruit heard [No Pitting Edema] : no pitting edema present

## 2021-10-20 NOTE — HISTORY OF PRESENT ILLNESS
[FreeTextEntry1] : 46 F with past medical history of Sarcoid with cardiac involvement seen on a cardiac MR, normal LV function, family history of early CAD, Sanchez syndrome. 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 She had a cardiac MR in 4/2019 showing questionable subtle subepicardial enhancement along the LV basal to mid inferior wall with adjacent small pericardial effusion favoring a sequalla of prior mild myocarditis although cannot exclude subtle involvement of sarcoidosis. \par \par She had a PET scan 9/11/19 that did not show any signs of cardiac sarcoid. She had an event monitor which was negative for any arrhythmias or signs of heart block. \par \par She is here for a followup visit. She is not formally exercising but remaining active with her work and yard work. \par She has mild dyspnea. She   denies any chest pain, PND, orthopnea, lower extremity edema, near syncope, syncope, strokelike symptoms. She denies any palpitations. She is compliant with her medications.

## 2021-10-20 NOTE — DISCUSSION/SUMMARY
[FreeTextEntry1] : 46 year woman with a history as listed presents for a followup visit. \par Alayna is has mild dyspnea likely from her underlying pulmonary disease. She denies any anginal symptoms. Clinically she is euvolemic on exam. Her EKG did not reveal any significant ischemic changes. She will undergo a treadmill exercise stress test to define exercise tolerance, rule out exertional hypertensive responses, assess for exercise induced arrhythmias and rule out ischemia from obstructive CAD. She will get a 2d echo to assess for any  new structural heart disease, changes in valvular and ventricular function. \par  She had a PET scan 9/11/19 that did not show any signs of cardiac sarcoid. She had an event monitor which was negative for any arrhythmias or signs of heart block.  \par She will increased Pravastatin 40mg HS given hyperlipidemia. \par Exercise and diet counseling was performed in order to reduce her future cardiovascular risk.\par She will followup with me in 6-12  months or sooner if necessary.

## 2021-11-02 ENCOUNTER — APPOINTMENT (OUTPATIENT)
Dept: OBGYN | Facility: CLINIC | Age: 46
End: 2021-11-02
Payer: COMMERCIAL

## 2021-11-02 PROCEDURE — 99214 OFFICE O/P EST MOD 30 MIN: CPT

## 2021-11-02 NOTE — PLAN
[FreeTextEntry1] : PT WITH GENETIC PREDISPOSITION TO CANCER. DISCUSSED OPTIONS. WOULD LIKE HYSTERECTOMY. DISCUSSED TOTAL HYST BSO. INFORMATION GIVEN. DISCUSSED ОЛЕГ. WILL SCHEDULE TVT-O AT TIME OF PROCEDURE. ILLUSTRATION DISCUSSED. ADVISED ON OPTIONS. WILL SCHEDULE. RISKS DISCUSSED

## 2021-11-10 ENCOUNTER — APPOINTMENT (OUTPATIENT)
Dept: CARDIOLOGY | Facility: CLINIC | Age: 46
End: 2021-11-10
Payer: COMMERCIAL

## 2021-11-10 PROCEDURE — 93306 TTE W/DOPPLER COMPLETE: CPT

## 2021-11-10 PROCEDURE — 93015 CV STRESS TEST SUPVJ I&R: CPT

## 2021-11-16 ENCOUNTER — TRANSCRIPTION ENCOUNTER (OUTPATIENT)
Age: 46
End: 2021-11-16

## 2021-12-20 ENCOUNTER — TRANSCRIPTION ENCOUNTER (OUTPATIENT)
Age: 46
End: 2021-12-20

## 2022-02-10 ENCOUNTER — TRANSCRIPTION ENCOUNTER (OUTPATIENT)
Age: 47
End: 2022-02-10

## 2022-02-10 RX ORDER — FLUTICASONE FUROATE AND VILANTEROL TRIFENATATE 200; 25 UG/1; UG/1
200-25 POWDER RESPIRATORY (INHALATION)
Qty: 3 | Refills: 0 | Status: ACTIVE | COMMUNITY
Start: 1900-01-01 | End: 1900-01-01

## 2022-02-22 ENCOUNTER — OUTPATIENT (OUTPATIENT)
Dept: OUTPATIENT SERVICES | Facility: HOSPITAL | Age: 47
LOS: 1 days | End: 2022-02-22
Payer: COMMERCIAL

## 2022-02-22 ENCOUNTER — RESULT REVIEW (OUTPATIENT)
Age: 47
End: 2022-02-22

## 2022-02-22 ENCOUNTER — APPOINTMENT (OUTPATIENT)
Dept: OBGYN | Facility: CLINIC | Age: 47
End: 2022-02-22
Payer: COMMERCIAL

## 2022-02-22 VITALS
WEIGHT: 158.07 LBS | HEART RATE: 85 BPM | TEMPERATURE: 98 F | HEIGHT: 63 IN | OXYGEN SATURATION: 100 % | RESPIRATION RATE: 18 BRPM | SYSTOLIC BLOOD PRESSURE: 123 MMHG | DIASTOLIC BLOOD PRESSURE: 78 MMHG

## 2022-02-22 VITALS
SYSTOLIC BLOOD PRESSURE: 120 MMHG | WEIGHT: 160 LBS | DIASTOLIC BLOOD PRESSURE: 70 MMHG | TEMPERATURE: 98.6 F | BODY MASS INDEX: 28.35 KG/M2 | HEIGHT: 63 IN

## 2022-02-22 DIAGNOSIS — K08.409 PARTIAL LOSS OF TEETH, UNSPECIFIED CAUSE, UNSPECIFIED CLASS: Chronic | ICD-10-CM

## 2022-02-22 DIAGNOSIS — Z98.890 OTHER SPECIFIED POSTPROCEDURAL STATES: Chronic | ICD-10-CM

## 2022-02-22 DIAGNOSIS — Z01.818 ENCOUNTER FOR OTHER PREPROCEDURAL EXAMINATION: ICD-10-CM

## 2022-02-22 DIAGNOSIS — Z15.09 GENETIC SUSCEPTIBILITY TO OTHER MALIGNANT NEOPLASM: ICD-10-CM

## 2022-02-22 DIAGNOSIS — N39.3 STRESS INCONTINENCE (FEMALE) (MALE): ICD-10-CM

## 2022-02-22 LAB
A1C WITH ESTIMATED AVERAGE GLUCOSE RESULT: 5.5 % — SIGNIFICANT CHANGE UP (ref 4–5.6)
ANION GAP SERPL CALC-SCNC: 5 MMOL/L — SIGNIFICANT CHANGE UP (ref 5–17)
APPEARANCE UR: CLEAR — SIGNIFICANT CHANGE UP
BASOPHILS # BLD AUTO: 0.02 K/UL — SIGNIFICANT CHANGE UP (ref 0–0.2)
BASOPHILS NFR BLD AUTO: 0.6 % — SIGNIFICANT CHANGE UP (ref 0–2)
BILIRUB UR-MCNC: NEGATIVE — SIGNIFICANT CHANGE UP
BUN SERPL-MCNC: 13 MG/DL — SIGNIFICANT CHANGE UP (ref 7–23)
CALCIUM SERPL-MCNC: 9.4 MG/DL — SIGNIFICANT CHANGE UP (ref 8.5–10.1)
CHLORIDE SERPL-SCNC: 105 MMOL/L — SIGNIFICANT CHANGE UP (ref 96–108)
CO2 SERPL-SCNC: 30 MMOL/L — SIGNIFICANT CHANGE UP (ref 22–31)
COLOR SPEC: YELLOW — SIGNIFICANT CHANGE UP
CREAT SERPL-MCNC: 0.81 MG/DL — SIGNIFICANT CHANGE UP (ref 0.5–1.3)
DIFF PNL FLD: NEGATIVE — SIGNIFICANT CHANGE UP
EOSINOPHIL # BLD AUTO: 0.16 K/UL — SIGNIFICANT CHANGE UP (ref 0–0.5)
EOSINOPHIL NFR BLD AUTO: 5.1 % — SIGNIFICANT CHANGE UP (ref 0–6)
ESTIMATED AVERAGE GLUCOSE: 111 MG/DL — SIGNIFICANT CHANGE UP (ref 68–114)
GLUCOSE SERPL-MCNC: 106 MG/DL — HIGH (ref 70–99)
GLUCOSE UR QL: NEGATIVE — SIGNIFICANT CHANGE UP
HCT VFR BLD CALC: 41.2 % — SIGNIFICANT CHANGE UP (ref 34.5–45)
HGB BLD-MCNC: 14 G/DL — SIGNIFICANT CHANGE UP (ref 11.5–15.5)
IMM GRANULOCYTES NFR BLD AUTO: 0.3 % — SIGNIFICANT CHANGE UP (ref 0–1.5)
KETONES UR-MCNC: NEGATIVE — SIGNIFICANT CHANGE UP
LEUKOCYTE ESTERASE UR-ACNC: ABNORMAL
LYMPHOCYTES # BLD AUTO: 1.06 K/UL — SIGNIFICANT CHANGE UP (ref 1–3.3)
LYMPHOCYTES # BLD AUTO: 33.8 % — SIGNIFICANT CHANGE UP (ref 13–44)
MCHC RBC-ENTMCNC: 31.2 PG — SIGNIFICANT CHANGE UP (ref 27–34)
MCHC RBC-ENTMCNC: 34 GM/DL — SIGNIFICANT CHANGE UP (ref 32–36)
MCV RBC AUTO: 91.8 FL — SIGNIFICANT CHANGE UP (ref 80–100)
MONOCYTES # BLD AUTO: 0.32 K/UL — SIGNIFICANT CHANGE UP (ref 0–0.9)
MONOCYTES NFR BLD AUTO: 10.2 % — SIGNIFICANT CHANGE UP (ref 2–14)
NEUTROPHILS # BLD AUTO: 1.57 K/UL — LOW (ref 1.8–7.4)
NEUTROPHILS NFR BLD AUTO: 50 % — SIGNIFICANT CHANGE UP (ref 43–77)
NITRITE UR-MCNC: NEGATIVE — SIGNIFICANT CHANGE UP
PH UR: 8 — SIGNIFICANT CHANGE UP (ref 5–8)
PLATELET # BLD AUTO: 247 K/UL — SIGNIFICANT CHANGE UP (ref 150–400)
POTASSIUM SERPL-MCNC: 4.1 MMOL/L — SIGNIFICANT CHANGE UP (ref 3.5–5.3)
POTASSIUM SERPL-SCNC: 4.1 MMOL/L — SIGNIFICANT CHANGE UP (ref 3.5–5.3)
PROT UR-MCNC: NEGATIVE — SIGNIFICANT CHANGE UP
RBC # BLD: 4.49 M/UL — SIGNIFICANT CHANGE UP (ref 3.8–5.2)
RBC # FLD: 12.2 % — SIGNIFICANT CHANGE UP (ref 10.3–14.5)
SODIUM SERPL-SCNC: 140 MMOL/L — SIGNIFICANT CHANGE UP (ref 135–145)
SP GR SPEC: 1.01 — SIGNIFICANT CHANGE UP (ref 1.01–1.02)
UROBILINOGEN FLD QL: NEGATIVE — SIGNIFICANT CHANGE UP
WBC # BLD: 3.14 K/UL — LOW (ref 3.8–10.5)
WBC # FLD AUTO: 3.14 K/UL — LOW (ref 3.8–10.5)

## 2022-02-22 PROCEDURE — 93005 ELECTROCARDIOGRAM TRACING: CPT

## 2022-02-22 PROCEDURE — 36415 COLL VENOUS BLD VENIPUNCTURE: CPT

## 2022-02-22 PROCEDURE — 86850 RBC ANTIBODY SCREEN: CPT

## 2022-02-22 PROCEDURE — 93010 ELECTROCARDIOGRAM REPORT: CPT

## 2022-02-22 PROCEDURE — 99213 OFFICE O/P EST LOW 20 MIN: CPT

## 2022-02-22 PROCEDURE — 86901 BLOOD TYPING SEROLOGIC RH(D): CPT

## 2022-02-22 PROCEDURE — 80048 BASIC METABOLIC PNL TOTAL CA: CPT

## 2022-02-22 PROCEDURE — 71046 X-RAY EXAM CHEST 2 VIEWS: CPT

## 2022-02-22 PROCEDURE — 81001 URINALYSIS AUTO W/SCOPE: CPT

## 2022-02-22 PROCEDURE — 86900 BLOOD TYPING SEROLOGIC ABO: CPT

## 2022-02-22 PROCEDURE — 71046 X-RAY EXAM CHEST 2 VIEWS: CPT | Mod: 26

## 2022-02-22 PROCEDURE — 83036 HEMOGLOBIN GLYCOSYLATED A1C: CPT

## 2022-02-22 PROCEDURE — G0463: CPT | Mod: 25

## 2022-02-22 PROCEDURE — 85025 COMPLETE CBC W/AUTO DIFF WBC: CPT

## 2022-02-22 RX ORDER — CHOLECALCIFEROL (VITAMIN D3) 125 MCG
1 CAPSULE ORAL
Qty: 0 | Refills: 0 | DISCHARGE

## 2022-02-22 RX ORDER — FLUTICASONE PROPIONATE AND SALMETEROL 50; 250 UG/1; UG/1
1 POWDER ORAL; RESPIRATORY (INHALATION)
Qty: 0 | Refills: 0 | DISCHARGE

## 2022-02-22 RX ORDER — CETIRIZINE HYDROCHLORIDE 10 MG/1
1 TABLET ORAL
Qty: 0 | Refills: 0 | DISCHARGE

## 2022-02-22 RX ORDER — ALBUTEROL 90 UG/1
2 AEROSOL, METERED ORAL
Qty: 0 | Refills: 0 | DISCHARGE

## 2022-02-22 RX ORDER — FLUTICASONE PROPIONATE 50 MCG
1 SPRAY, SUSPENSION NASAL
Qty: 0 | Refills: 0 | DISCHARGE

## 2022-02-22 NOTE — COUNSELING
[Breast Self Exam] : breast self exam [Medication Management] : medication management [Pre/Post Op Instructions] : pre/post op instructions

## 2022-02-22 NOTE — PLAN
[FreeTextEntry1] : THE PATIENT HAS BEEN COUNSELLED ON THE RISKS AND BENEFITS OF SURGERY WHICH CAN BE ASSOCIATED WITH MEDICAL OR SURGICAL COMPLICATIONS. THESE CAN INCLUDE BLEEDING, INFECTION AND INJURY TO ABDOMINAL AND PELVIC STRUCTURES INCLUDING THE BOWEL, BLADDER, URETERS, BLOOD VESSELS, NERVES AND OTHER STRUCTURES, RARE INJURY TO NERVES IN THE ARMS CAN OCCUR RELATED TO POSITIONING DURING THE OPERATION. WOUND COMPLICATIONS INCLUDING SEPARATION, HERNIA OR INFECTIONS CAN OCCUR. ANESTHESIA  RELATED COMPLICATIONS CAN OCCUR. BLOOD CLOTS OR PULMONARY EMBOLUS CAN OCCUR. ILLUSTRATION HAS BEEN SHOWN TO THE PATIENT EXPLAINING THE SCHEDULED PROCEDURE. BOWEL PREP AND POSTOP PAIN MEDICATION PRESCRIBED.

## 2022-02-22 NOTE — H&P PST ADULT - NSICDXPASTMEDICALHX_GEN_ALL_CORE_FT
PAST MEDICAL HISTORY:  Asthma last eval with pulmonologist in 3/2019    Gestational diabetes     Hyperlipidemia     Sanchez syndrome     Sarcoidosis     Thyroid nodule last evaluation in 2017     PAST MEDICAL HISTORY:  Asthma last eval with pulmonologist in 3/2019    Gestational diabetes     Hyperlipidemia     Sanchez syndrome     Sarcoidosis     Thyroid nodule last evaluation in 2017    Uterine fibroid

## 2022-02-22 NOTE — H&P PST ADULT - NSICDXPASTSURGICALHX_GEN_ALL_CORE_FT
PAST SURGICAL HISTORY:  S/P dilation and curettage TOP 1993    [place] teeth extracted      PAST SURGICAL HISTORY:  History of bronchoscopy EBUS    S/P dilation and curettage TOP 1993    [place] teeth extracted

## 2022-02-22 NOTE — H&P PST ADULT - HISTORY OF PRESENT ILLNESS
45 yo female with hx of asthma, thyroid nodule presents to have PST evaluation for bronchoscopy, endobronchial ultrasound with cytology on 4/1/2019. Patient reports, hx of chest pain 3 yrs ago, had work up done - which revealed "splenic nodule, enlarging lymph node on chest", has been f/u with CT/PET scan- Last done in 3/2019, surgical intervention was recommended. 47 yo female with Sanchez syndrome, PMH of  sarcoidosis, asthma, thyroid nodule, HLD and uterine fibroid. Pt denies vaginal bleeding. She is here for PST for planned Robot assisted Total hysterectomy, BSO and tension free vaginal tape obturator.

## 2022-02-22 NOTE — H&P PST ADULT - ASSESSMENT
47 yo female with Sanchez syndrome, PMH of  sarcoidosis, asthma, thyroid nodule, HLD and uterine fibroid. Pt denies vaginal bleeding. She is scheduled for Robot assisted Total hysterectomy, BSO and tension free vaginal tape obturator.   Plan  1. Stop all NSAIDS, herbal supplements and vitamins for 7 days.  2. NPO as per ASU instructions.  3. Take the following medications ( use inhalers ) with small sips of water on the morning of your procedure/surgery.  4. Use EZ sponges as directed  5. Labs, EKG, CXR done today. COVID test on 02/27/2022, pt instructed.  6. PMD visit for optimization prior to surgery (pt with Hx of Sarcoidosis, Sanchez syndrome).  7. STAT urine pregnancy test on admission.            47 yo female with Sanchez syndrome, PMH of  sarcoidosis, asthma, thyroid nodule, HLD and uterine fibroid. Pt denies vaginal bleeding. She is scheduled for Robot assisted Total hysterectomy, BSO and tension free vaginal tape obturator.   Plan  1. Stop all NSAIDS, herbal supplements and vitamins for 7 days.  2. NPO as per ASU instructions.  3. Use inhalers on the morning of your procedure/surgery.  4. Use EZ sponges as directed  5. Labs, EKG, CXR done today. COVID test on 02/27/2022, pt instructed.  6. PMD visit for optimization prior to surgery (pt with Hx of Sarcoidosis, Sanchez syndrome).  7. STAT urine pregnancy test on admission.

## 2022-02-23 ENCOUNTER — APPOINTMENT (OUTPATIENT)
Dept: FAMILY MEDICINE | Facility: CLINIC | Age: 47
End: 2022-02-23
Payer: COMMERCIAL

## 2022-02-23 VITALS
HEIGHT: 63 IN | DIASTOLIC BLOOD PRESSURE: 72 MMHG | SYSTOLIC BLOOD PRESSURE: 102 MMHG | WEIGHT: 166 LBS | BODY MASS INDEX: 29.41 KG/M2 | TEMPERATURE: 97.6 F | HEART RATE: 82 BPM | RESPIRATION RATE: 16 BRPM | OXYGEN SATURATION: 99 %

## 2022-02-23 DIAGNOSIS — Z15.09 GENETIC SUSCEPTIBILITY TO OTHER MALIGNANT NEOPLASM: ICD-10-CM

## 2022-02-23 DIAGNOSIS — Z01.818 ENCOUNTER FOR OTHER PREPROCEDURAL EXAMINATION: ICD-10-CM

## 2022-02-23 DIAGNOSIS — Z87.09 PERSONAL HISTORY OF OTHER DISEASES OF THE RESPIRATORY SYSTEM: ICD-10-CM

## 2022-02-23 PROCEDURE — 99214 OFFICE O/P EST MOD 30 MIN: CPT

## 2022-02-23 RX ORDER — HYDROXYCHLOROQUINE SULFATE 200 MG/1
200 TABLET, FILM COATED ORAL
Qty: 60 | Refills: 1 | Status: COMPLETED | COMMUNITY
Start: 2020-05-28 | End: 2022-02-23

## 2022-02-23 RX ORDER — CETIRIZINE HYDROCHLORIDE 10 MG/1
TABLET, FILM COATED ORAL
Refills: 0 | Status: COMPLETED | COMMUNITY
End: 2022-02-23

## 2022-02-23 RX ORDER — CETIRIZINE HYDROCHLORIDE 10 MG/1
10 TABLET, FILM COATED ORAL
Refills: 0 | Status: ACTIVE | COMMUNITY

## 2022-02-23 NOTE — PHYSICAL EXAM
[Well Nourished] : well nourished [Well Developed] : well developed [Well-Appearing] : well-appearing [Normal Sclera/Conjunctiva] : normal sclera/conjunctiva [Normal Outer Ear/Nose] : the outer ears and nose were normal in appearance [No JVD] : no jugular venous distention [No Lymphadenopathy] : no lymphadenopathy [Supple] : supple [No Respiratory Distress] : no respiratory distress  [No Accessory Muscle Use] : no accessory muscle use [Clear to Auscultation] : lungs were clear to auscultation bilaterally [Normal Rate] : normal rate  [Regular Rhythm] : with a regular rhythm [Normal S1, S2] : normal S1 and S2 [No Carotid Bruits] : no carotid bruits [No Abdominal Bruit] : a ~M bruit was not heard ~T in the abdomen [No Varicosities] : no varicosities [Pedal Pulses Present] : the pedal pulses are present [No Edema] : there was no peripheral edema [No Palpable Aorta] : no palpable aorta [No Extremity Clubbing/Cyanosis] : no extremity clubbing/cyanosis [Soft] : abdomen soft [Non Tender] : non-tender [Non-distended] : non-distended [No Masses] : no abdominal mass palpated [No HSM] : no HSM [Normal Bowel Sounds] : normal bowel sounds [Normal Anterior Cervical Nodes] : no anterior cervical lymphadenopathy [No CVA Tenderness] : no CVA  tenderness [No Spinal Tenderness] : no spinal tenderness [No Joint Swelling] : no joint swelling [Grossly Normal Strength/Tone] : grossly normal strength/tone [No Rash] : no rash [Coordination Grossly Intact] : coordination grossly intact [Normal Gait] : normal gait [Normal Affect] : the affect was normal [Normal Insight/Judgement] : insight and judgment were intact [de-identified] : grade 2 systolic murur.

## 2022-02-23 NOTE — ASSESSMENT
[High Risk Surgery - Intraperitoneal, Intrathoracic or Supringuinal Vascular Procedures] : High Risk Surgery - Intraperitoneal, Intrathoracic or Supringuinal Vascular Procedures - No (0) [Ischemic Heart Disease] : Ischemic Heart Disease - No (0) [Congestive Heart Failure] : Congestive Heart Failure - No (0) [Prior Cerebrovascular Accident or TIA] : Prior Cerebrovascular Accident or TIA - No (0) [Creatinine >= 2mg/dL (1 Point)] : Creatinine >= 2mg/dL - No (0) [Insulin-dependent Diabetic (1 Point)] : Insulin-dependent Diabetic - No (0) [0] : 0 , RCRI Class: I, Risk of Post-Op Cardiac Complications: 3.9%, 95% CI for Risk Estimate: 2.8% - 5.4% [Patient Optimized for Surgery] : Patient optimized for surgery [No Further Testing Recommended] : no further testing recommended [Continue medications as is] : Continue current medications [As per surgery] : as per surgery [FreeTextEntry4] : Ms. ANGE MCLEAN is a 46 year old female presenting for pre op evaluation\par Sanchez syndrome, scheduled for Hysterectomy [FreeTextEntry7] : hold OTC supplements for 7 days

## 2022-02-23 NOTE — HISTORY OF PRESENT ILLNESS
[No Pertinent Cardiac History] : no history of aortic stenosis, atrial fibrillation, coronary artery disease, recent myocardial infarction, or implantable device/pacemaker [Asthma] : asthma [COPD] : no COPD [Sleep Apnea] : no sleep apnea [Smoker] : not a smoker [No Adverse Anesthesia Reaction] : no adverse anesthesia reaction in self or family member [Chronic Anticoagulation] : no chronic anticoagulation [Chronic Kidney Disease] : no chronic kidney disease [Diabetes] : no diabetes [(Patient denies any chest pain, claudication, dyspnea on exertion, orthopnea, palpitations or syncope)] : Patient denies any chest pain, claudication, dyspnea on exertion, orthopnea, palpitations or syncope [FreeTextEntry1] : Hysterectomy  [FreeTextEntry2] : 3/2/2022 [FreeTextEntry3] : Dr Hood [FreeTextEntry4] : Ms. ANGE MCLEAN is a 46 year old female presenting for pre op evaluation\par Sanchez syndrome, scheduled for Hysterectomy\par Hx of Sarcoidosis getting annual CT at Dr Stahl in [place]\par Seen by Cardiologist 10/21\par Had colonoscopy recently, will go annually.\par

## 2022-02-23 NOTE — PLAN
[FreeTextEntry1] : Ms. ANGE MCLEAN is a 46 year old female presenting for pre op evaluation\par Sanchez syndrome, scheduled for Hysterectomy\par Hx of Sarcoidosis getting annual CT at Dr Stahl in [facility]\par Seen by Cardiologist 10/21\par Had colonoscopy recently, will go annually.\par \par UA shows leukocytes, Cx ordered\par will treat if urine Cx shows infection. Patient asymptomatic.

## 2022-02-25 ENCOUNTER — TRANSCRIPTION ENCOUNTER (OUTPATIENT)
Age: 47
End: 2022-02-25

## 2022-02-25 LAB — BACTERIA UR CULT: NORMAL

## 2022-03-01 RX ORDER — SODIUM CHLORIDE 9 MG/ML
1000 INJECTION, SOLUTION INTRAVENOUS
Refills: 0 | Status: DISCONTINUED | OUTPATIENT
Start: 2022-03-02 | End: 2022-03-02

## 2022-03-01 RX ORDER — FENTANYL CITRATE 50 UG/ML
50 INJECTION INTRAVENOUS
Refills: 0 | Status: DISCONTINUED | OUTPATIENT
Start: 2022-03-02 | End: 2022-03-02

## 2022-03-02 ENCOUNTER — OUTPATIENT (OUTPATIENT)
Dept: INPATIENT UNIT | Facility: HOSPITAL | Age: 47
LOS: 1 days | Discharge: HOME | End: 2022-03-02
Payer: COMMERCIAL

## 2022-03-02 ENCOUNTER — RESULT REVIEW (OUTPATIENT)
Age: 47
End: 2022-03-02

## 2022-03-02 ENCOUNTER — APPOINTMENT (OUTPATIENT)
Dept: OBGYN | Facility: HOSPITAL | Age: 47
End: 2022-03-02

## 2022-03-02 VITALS
OXYGEN SATURATION: 96 % | WEIGHT: 158.07 LBS | DIASTOLIC BLOOD PRESSURE: 75 MMHG | HEART RATE: 95 BPM | RESPIRATION RATE: 15 BRPM | HEIGHT: 63 IN | TEMPERATURE: 98 F | SYSTOLIC BLOOD PRESSURE: 110 MMHG

## 2022-03-02 DIAGNOSIS — Z98.890 OTHER SPECIFIED POSTPROCEDURAL STATES: Chronic | ICD-10-CM

## 2022-03-02 DIAGNOSIS — N39.3 STRESS INCONTINENCE (FEMALE) (MALE): ICD-10-CM

## 2022-03-02 DIAGNOSIS — Z15.09 GENETIC SUSCEPTIBILITY TO OTHER MALIGNANT NEOPLASM: ICD-10-CM

## 2022-03-02 DIAGNOSIS — K08.409 PARTIAL LOSS OF TEETH, UNSPECIFIED CAUSE, UNSPECIFIED CLASS: Chronic | ICD-10-CM

## 2022-03-02 LAB
ANION GAP SERPL CALC-SCNC: 5 MMOL/L — SIGNIFICANT CHANGE UP (ref 5–17)
BASOPHILS # BLD AUTO: 0.02 K/UL — SIGNIFICANT CHANGE UP (ref 0–0.2)
BASOPHILS NFR BLD AUTO: 0.2 % — SIGNIFICANT CHANGE UP (ref 0–2)
BUN SERPL-MCNC: 12 MG/DL — SIGNIFICANT CHANGE UP (ref 7–23)
CALCIUM SERPL-MCNC: 8.6 MG/DL — SIGNIFICANT CHANGE UP (ref 8.5–10.1)
CHLORIDE SERPL-SCNC: 108 MMOL/L — SIGNIFICANT CHANGE UP (ref 96–108)
CO2 SERPL-SCNC: 28 MMOL/L — SIGNIFICANT CHANGE UP (ref 22–31)
CREAT SERPL-MCNC: 0.68 MG/DL — SIGNIFICANT CHANGE UP (ref 0.5–1.3)
EGFR: 107 ML/MIN/1.73M2 — SIGNIFICANT CHANGE UP
EOSINOPHIL # BLD AUTO: 0 K/UL — SIGNIFICANT CHANGE UP (ref 0–0.5)
EOSINOPHIL NFR BLD AUTO: 0 % — SIGNIFICANT CHANGE UP (ref 0–6)
GLUCOSE SERPL-MCNC: 149 MG/DL — HIGH (ref 70–99)
HCG UR QL: NEGATIVE — SIGNIFICANT CHANGE UP
HCT VFR BLD CALC: 38.3 % — SIGNIFICANT CHANGE UP (ref 34.5–45)
HGB BLD-MCNC: 12.9 G/DL — SIGNIFICANT CHANGE UP (ref 11.5–15.5)
IMM GRANULOCYTES NFR BLD AUTO: 0.3 % — SIGNIFICANT CHANGE UP (ref 0–1.5)
LYMPHOCYTES # BLD AUTO: 0.43 K/UL — LOW (ref 1–3.3)
LYMPHOCYTES # BLD AUTO: 4.4 % — LOW (ref 13–44)
MCHC RBC-ENTMCNC: 31.2 PG — SIGNIFICANT CHANGE UP (ref 27–34)
MCHC RBC-ENTMCNC: 33.7 GM/DL — SIGNIFICANT CHANGE UP (ref 32–36)
MCV RBC AUTO: 92.7 FL — SIGNIFICANT CHANGE UP (ref 80–100)
MONOCYTES # BLD AUTO: 0.19 K/UL — SIGNIFICANT CHANGE UP (ref 0–0.9)
MONOCYTES NFR BLD AUTO: 1.9 % — LOW (ref 2–14)
NEUTROPHILS # BLD AUTO: 9.1 K/UL — HIGH (ref 1.8–7.4)
NEUTROPHILS NFR BLD AUTO: 93.2 % — HIGH (ref 43–77)
PLATELET # BLD AUTO: 202 K/UL — SIGNIFICANT CHANGE UP (ref 150–400)
POTASSIUM SERPL-MCNC: 3.6 MMOL/L — SIGNIFICANT CHANGE UP (ref 3.5–5.3)
POTASSIUM SERPL-SCNC: 3.6 MMOL/L — SIGNIFICANT CHANGE UP (ref 3.5–5.3)
RBC # BLD: 4.13 M/UL — SIGNIFICANT CHANGE UP (ref 3.8–5.2)
RBC # FLD: 12.1 % — SIGNIFICANT CHANGE UP (ref 10.3–14.5)
SODIUM SERPL-SCNC: 141 MMOL/L — SIGNIFICANT CHANGE UP (ref 135–145)
WBC # BLD: 9.77 K/UL — SIGNIFICANT CHANGE UP (ref 3.8–10.5)
WBC # FLD AUTO: 9.77 K/UL — SIGNIFICANT CHANGE UP (ref 3.8–10.5)

## 2022-03-02 PROCEDURE — S2900: CPT

## 2022-03-02 PROCEDURE — 85027 COMPLETE CBC AUTOMATED: CPT

## 2022-03-02 PROCEDURE — 36415 COLL VENOUS BLD VENIPUNCTURE: CPT

## 2022-03-02 PROCEDURE — 82962 GLUCOSE BLOOD TEST: CPT

## 2022-03-02 PROCEDURE — 80048 BASIC METABOLIC PNL TOTAL CA: CPT

## 2022-03-02 PROCEDURE — 58570 TLH UTERUS 250 G OR LESS: CPT

## 2022-03-02 PROCEDURE — 57288 REPAIR BLADDER DEFECT: CPT

## 2022-03-02 PROCEDURE — S2900 ROBOTIC SURGICAL SYSTEM: CPT | Mod: NC

## 2022-03-02 PROCEDURE — 85025 COMPLETE CBC W/AUTO DIFF WBC: CPT

## 2022-03-02 PROCEDURE — C1889: CPT

## 2022-03-02 PROCEDURE — C9399: CPT

## 2022-03-02 PROCEDURE — C1771: CPT

## 2022-03-02 PROCEDURE — 88307 TISSUE EXAM BY PATHOLOGIST: CPT | Mod: 26

## 2022-03-02 PROCEDURE — 58570 TLH UTERUS 250 G OR LESS: CPT | Mod: AS

## 2022-03-02 PROCEDURE — 81025 URINE PREGNANCY TEST: CPT

## 2022-03-02 PROCEDURE — 88307 TISSUE EXAM BY PATHOLOGIST: CPT

## 2022-03-02 RX ORDER — HYDROMORPHONE HYDROCHLORIDE 2 MG/ML
1 INJECTION INTRAMUSCULAR; INTRAVENOUS; SUBCUTANEOUS EVERY 4 HOURS
Refills: 0 | Status: DISCONTINUED | OUTPATIENT
Start: 2022-03-02 | End: 2022-03-03

## 2022-03-02 RX ORDER — MONTELUKAST 4 MG/1
1 TABLET, CHEWABLE ORAL
Qty: 0 | Refills: 0 | DISCHARGE

## 2022-03-02 RX ORDER — ACETAMINOPHEN WITH CODEINE 300MG-30MG
1 TABLET ORAL EVERY 4 HOURS
Refills: 0 | Status: DISCONTINUED | OUTPATIENT
Start: 2022-03-02 | End: 2022-03-03

## 2022-03-02 RX ORDER — FLUTICASONE FUROATE AND VILANTEROL TRIFENATATE 100; 25 UG/1; UG/1
1 POWDER RESPIRATORY (INHALATION)
Qty: 0 | Refills: 0 | DISCHARGE

## 2022-03-02 RX ORDER — OXYCODONE HYDROCHLORIDE 5 MG/1
1 TABLET ORAL
Qty: 10 | Refills: 0
Start: 2022-03-02

## 2022-03-02 RX ORDER — ONDANSETRON 8 MG/1
4 TABLET, FILM COATED ORAL EVERY 6 HOURS
Refills: 0 | Status: DISCONTINUED | OUTPATIENT
Start: 2022-03-02 | End: 2022-03-03

## 2022-03-02 RX ORDER — ONDANSETRON 8 MG/1
4 TABLET, FILM COATED ORAL ONCE
Refills: 0 | Status: COMPLETED | OUTPATIENT
Start: 2022-03-02 | End: 2022-03-02

## 2022-03-02 RX ORDER — TIOTROPIUM BROMIDE 18 UG/1
2 CAPSULE ORAL; RESPIRATORY (INHALATION)
Qty: 0 | Refills: 0 | DISCHARGE

## 2022-03-02 RX ORDER — OXYCODONE AND ACETAMINOPHEN 5; 325 MG/1; MG/1
1 TABLET ORAL EVERY 4 HOURS
Refills: 0 | Status: DISCONTINUED | OUTPATIENT
Start: 2022-03-02 | End: 2022-03-03

## 2022-03-02 RX ORDER — OXYCODONE HYDROCHLORIDE 5 MG/1
5 TABLET ORAL ONCE
Refills: 0 | Status: DISCONTINUED | OUTPATIENT
Start: 2022-03-02 | End: 2022-03-02

## 2022-03-02 RX ORDER — FLUTICASONE PROPIONATE 50 MCG
1 SPRAY, SUSPENSION NASAL
Qty: 0 | Refills: 0 | DISCHARGE

## 2022-03-02 RX ORDER — ALBUTEROL 90 UG/1
2 AEROSOL, METERED ORAL
Qty: 0 | Refills: 0 | DISCHARGE

## 2022-03-02 RX ORDER — ACETAMINOPHEN 500 MG
650 TABLET ORAL EVERY 6 HOURS
Refills: 0 | Status: DISCONTINUED | OUTPATIENT
Start: 2022-03-02 | End: 2022-03-03

## 2022-03-02 RX ADMIN — OXYCODONE AND ACETAMINOPHEN 1 TABLET(S): 5; 325 TABLET ORAL at 22:06

## 2022-03-02 RX ADMIN — Medication 650 MILLIGRAM(S): at 17:45

## 2022-03-02 RX ADMIN — ONDANSETRON 4 MILLIGRAM(S): 8 TABLET, FILM COATED ORAL at 13:05

## 2022-03-02 RX ADMIN — OXYCODONE AND ACETAMINOPHEN 1 TABLET(S): 5; 325 TABLET ORAL at 22:36

## 2022-03-02 RX ADMIN — SODIUM CHLORIDE 75 MILLILITER(S): 9 INJECTION, SOLUTION INTRAVENOUS at 10:47

## 2022-03-02 RX ADMIN — Medication 650 MILLIGRAM(S): at 16:51

## 2022-03-02 RX ADMIN — ONDANSETRON 4 MILLIGRAM(S): 8 TABLET, FILM COATED ORAL at 18:17

## 2022-03-02 RX ADMIN — OXYCODONE HYDROCHLORIDE 5 MILLIGRAM(S): 5 TABLET ORAL at 11:10

## 2022-03-02 NOTE — ASU PATIENT PROFILE, ADULT - FALL HARM RISK - UNIVERSAL INTERVENTIONS
Bed in lowest position, wheels locked, appropriate side rails in place/Call bell, personal items and telephone in reach/Instruct patient to call for assistance before getting out of bed or chair/Non-slip footwear when patient is out of bed/[place] to call system/Physically safe environment - no spills, clutter or unnecessary equipment/Purposeful Proactive Rounding/Room/bathroom lighting operational, light cord in reach

## 2022-03-02 NOTE — ASU DISCHARGE PLAN (ADULT/PEDIATRIC) - NS MD DC FALL RISK RISK
For information on Fall & Injury Prevention, visit: [email or web address] OR  [email or web address] OR  https://www.cdc.gov/steadi/patient.html

## 2022-03-02 NOTE — BRIEF OPERATIVE NOTE - COMMENTS
Dictation #  Surgicel powder Dictation #   Surgicel powder Dictation # [id number]  Surgicel powder  [facility] TVT-O Abbaid[place]

## 2022-03-02 NOTE — BRIEF OPERATIVE NOTE - ASSISTANT(S)
Jenny Townsend RPA, Mariola Orr, Pratima Ruiz Jenny Townsend, TIMI, Mariola Orr, Pratima Vanessa Jenny Townsend RPA, Mariola Orr, Deann Vanessa

## 2022-03-02 NOTE — BRIEF OPERATIVE NOTE - NSICDXBRIEFPROCEDURE_GEN_ALL_CORE_FT
PROCEDURES:  Hysterectomy, total, robot-assisted, laparoscopic, using da Sreekanth Xi, with bilateral salpingo-oophorectomy and cystoscopy 02-Mar-2022 10:09:25  Mariola Orr  Creation of urethral sling using synthetic tape by transobturator approach or using tension-free vaginal tape, with cystoscopy 02-Mar-2022 10:10:16  Mariola Orr   PROCEDURES:  Hysterectomy, total, robot-assisted, laparoscopic, using da Sreekanth Xi, with bilateral salpingo-oophorectomy and cystoscopy 02-Mar-2022 10:09:25  Mariola Orr  Creation of urethral sling using synthetic tape by transobturator approach or using tension-free vaginal tape, with cystoscopy 02-Mar-2022 10:10:16  Mariola Orr  Block, transversus abdominis plane, bilateral 02-Mar-2022 10:19:23  Mariola Orr

## 2022-03-02 NOTE — BRIEF OPERATIVE NOTE - OPERATION/FINDINGS
EUA- small, mobile uterus, normal appearing external female genitalia  Laparoscopic- small mobile uterus with 3cm posterior lower uterine segment fibroid, normal appearing bilateral ovaries and fallopian tubes, normal appearing appendix  Cystoscopy- brisk bilateral ureteral jets, intact bladder mucosa throughout EUA- small, mobile uterus, normal appearing external female genitalia, cervix and vagina   Laparoscopic- small mobile uterus with 3cm posterior lower uterine segment fibroid, normal appearing bilateral ovaries and fallopian tubes, normal appearing appendix  Cystoscopy- brisk bilateral ureteral jets, intact bladder mucosa throughout

## 2022-03-02 NOTE — BRIEF OPERATIVE NOTE - NSICDXBRIEFPOSTOP_GEN_ALL_CORE_FT
POST-OP DIAGNOSIS:  PMS2-related Sanchez syndrome (HNPCC4) 02-Mar-2022 10:11:15  Mariola Orr  Fibroid uterus 02-Mar-2022 10:11:25  Mariola Orr  Urinary, incontinence, stress female 02-Mar-2022 10:11:42  Mariola Orr

## 2022-03-02 NOTE — ASU DISCHARGE PLAN (ADULT/PEDIATRIC) - CARE PROVIDER_API CALL
Lino Hood)  Obstetrics and Gynecology  [address] [place] [id number]  Phone: [phone]  Fax: [phone]  Follow Up Time:

## 2022-03-02 NOTE — BRIEF OPERATIVE NOTE - NSICDXBRIEFPREOP_GEN_ALL_CORE_FT
PRE-OP DIAGNOSIS:  PMS2-related Sanchez syndrome (HNPCC4) 02-Mar-2022 10:10:38  Mariola Orr  Fibroid uterus 02-Mar-2022 10:10:46  Mariola Orr  Urinary, incontinence, stress female 02-Mar-2022 10:11:02  Mariola Orr

## 2022-03-02 NOTE — ASU PATIENT PROFILE, ADULT - NSICDXPASTSURGICALHX_GEN_ALL_CORE_FT
PAST SURGICAL HISTORY:  History of bronchoscopy EBUS    S/P dilation and curettage TOP 1993    [place] teeth extracted

## 2022-03-02 NOTE — ASU PATIENT PROFILE, ADULT - NSICDXPASTMEDICALHX_GEN_ALL_CORE_FT
PAST MEDICAL HISTORY:  Asthma last eval with pulmonologist in 3/2019    Gestational diabetes     Hyperlipidemia     Sanchez syndrome     Sarcoidosis     Thyroid nodule last evaluation in 2017    Uterine fibroid

## 2022-03-03 ENCOUNTER — TRANSCRIPTION ENCOUNTER (OUTPATIENT)
Age: 47
End: 2022-03-03

## 2022-03-03 VITALS
RESPIRATION RATE: 17 BRPM | SYSTOLIC BLOOD PRESSURE: 105 MMHG | DIASTOLIC BLOOD PRESSURE: 62 MMHG | TEMPERATURE: 98 F | HEART RATE: 97 BPM | OXYGEN SATURATION: 98 %

## 2022-03-03 LAB
HCT VFR BLD CALC: 34.1 % — LOW (ref 34.5–45)
HGB BLD-MCNC: 11.4 G/DL — LOW (ref 11.5–15.5)
MCHC RBC-ENTMCNC: 31.1 PG — SIGNIFICANT CHANGE UP (ref 27–34)
MCHC RBC-ENTMCNC: 33.4 GM/DL — SIGNIFICANT CHANGE UP (ref 32–36)
MCV RBC AUTO: 92.9 FL — SIGNIFICANT CHANGE UP (ref 80–100)
PLATELET # BLD AUTO: 201 K/UL — SIGNIFICANT CHANGE UP (ref 150–400)
RBC # BLD: 3.67 M/UL — LOW (ref 3.8–5.2)
RBC # FLD: 12.3 % — SIGNIFICANT CHANGE UP (ref 10.3–14.5)
WBC # BLD: 6.79 K/UL — SIGNIFICANT CHANGE UP (ref 3.8–10.5)
WBC # FLD AUTO: 6.79 K/UL — SIGNIFICANT CHANGE UP (ref 3.8–10.5)

## 2022-03-03 RX ORDER — ACETAMINOPHEN 500 MG
2 TABLET ORAL
Qty: 0 | Refills: 0 | DISCHARGE
Start: 2022-03-03

## 2022-03-03 RX ORDER — CHOLECALCIFEROL (VITAMIN D3) 125 MCG
1 CAPSULE ORAL
Qty: 0 | Refills: 0 | DISCHARGE

## 2022-03-03 RX ADMIN — OXYCODONE AND ACETAMINOPHEN 1 TABLET(S): 5; 325 TABLET ORAL at 13:24

## 2022-03-03 RX ADMIN — OXYCODONE AND ACETAMINOPHEN 1 TABLET(S): 5; 325 TABLET ORAL at 02:28

## 2022-03-03 RX ADMIN — OXYCODONE AND ACETAMINOPHEN 1 TABLET(S): 5; 325 TABLET ORAL at 02:58

## 2022-03-03 RX ADMIN — OXYCODONE AND ACETAMINOPHEN 1 TABLET(S): 5; 325 TABLET ORAL at 13:54

## 2022-03-03 NOTE — DISCHARGE NOTE NURSING/CASE MANAGEMENT/SOCIAL WORK - NSDCPEFALRISK_GEN_ALL_CORE
For information on Fall & Injury Prevention, visit: [email or web address] OR  [email or web address] OR  https://www.cdc.gov/steadi/patient.html

## 2022-03-03 NOTE — PROGRESS NOTE ADULT - SUBJECTIVE AND OBJECTIVE BOX
GYNECOLOGIC PROGRESS NOTE    POD#    PROBLEMS:      Pt seen and examined at bedside.     SUBJECTIVE:    Patient is without complaints.  Pain well-controlled.  Flatus:  Denies Nausea, Vomiting or Diarrhea.  Denies shortness of breath, chest pain or dyspnea on exertion.  Tolerating diet.    OBJECTIVE:     VITALS:  T(F): 98.5 (03-03-22 @ 05:18), Max: 98.5 (03-03-22 @ 05:18)  HR: 104 (03-03-22 @ 05:18) (84 - 111)  BP: 99/50 (03-03-22 @ 05:18) (99/50 - 126/80)  RR: 18 (03-03-22 @ 05:18) (10 - 18)  SpO2: 95% (03-03-22 @ 05:18) (95% - 100%)  Wt(kg): --    I&O's Summary    02 Mar 2022 07:01  -  03 Mar 2022 06:54  --------------------------------------------------------  IN: 602 mL / OUT: 1375 mL / NET: -773 mL        MEDICATIONS  (STANDING):    MEDICATIONS  (PRN):  acetaminophen     Tablet .. 650 milliGRAM(s) Oral every 6 hours PRN Mild Pain (1 - 3)  acetaminophen  300 mG/codeine 30 mG 1 Tablet(s) Oral every 4 hours PRN Moderate Pain (4 - 6)  HYDROmorphone  Injectable 1 milliGRAM(s) IV Push every 4 hours PRN Severe Pain (7 - 10)  ondansetron Injectable 4 milliGRAM(s) IV Push every 6 hours PRN Nausea  oxycodone    5 mG/acetaminophen 325 mG 1 Tablet(s) Oral every 4 hours PRN Moderate Pain (4 - 6)      Physical Exam:  Constitutional: NAD  Pulmonary: clear to auscultation bilaterally   Cardiovascular: Regular rate and rhythm   Abdomen: soft, non-tender, non-distended, normal bowel sounds  Extremities: no lower extremity edema or calve tenderness, Casey's sign negative.  Incision: Clean, dry, intact.  Without signs of infection or hernia.    LABS:                        12.9   9.77  )-----------( 202      ( 02 Mar 2022 12:50 )             38.3     03-02    141  |  108  |  12  ----------------------------<  149<H>  3.6   |  28  |  0.68    Ca    8.6      02 Mar 2022 12:50            RADIOLOGY & ADDITIONAL TESTS:    A/P:   Cancer type:   Neuro: Pain well controlled. Continue current pain regimen.  CV: No history of cardiovascular disease. Blood pressure well controlled.  Pulm: No active disease. Saturating well on room air. Incentive spirometer use encouraged  GI: No active disease. Bowel sounds and function normal, tolerating PO diet. Continue current bowel regimen.   : Crawley removed, voiding spontaneously.  Heme: Hgb _ -> AM labs pending  ID: Afebrile. No antibiotics indicated at this time.   Endo: No active disease.   FEN: IVF at _. Will discontinue IVF when tolerating PO. Electrolytes WNL. AM labs pending.   Skin: No active disease.   Psych: No active disease.   DVT ppx: Ambulation encouraged, SCDs when in bed, lovenox ordered.  Dispo: Pending labs and AM rounds.    GYNECOLOGIC PROGRESS NOTE    POD#1    PROBLEMS:      Pt seen and examined at bedside.     SUBJECTIVE:    Patient is without complaints except some left groin pain  Pain well-controlled.  Flatus:  Denies Nausea, Vomiting or Diarrhea.  Denies shortness of breath, chest pain or dyspnea on exertion.  Tolerating diet.    OBJECTIVE:     VITALS:  T(F): 98.5 (03-03-22 @ 05:18), Max: 98.5 (03-03-22 @ 05:18)  HR: 104 (03-03-22 @ 05:18) (84 - 111)  BP: 99/50 (03-03-22 @ 05:18) (99/50 - 126/80)  RR: 18 (03-03-22 @ 05:18) (10 - 18)  SpO2: 95% (03-03-22 @ 05:18) (95% - 100%)  Wt(kg): --    I&O's Summary    02 Mar 2022 07:01  -  03 Mar 2022 06:54  --------------------------------------------------------  IN: 602 mL / OUT: 1375 mL / NET: -773 mL        MEDICATIONS  (STANDING):    MEDICATIONS  (PRN):  acetaminophen     Tablet .. 650 milliGRAM(s) Oral every 6 hours PRN Mild Pain (1 - 3)  acetaminophen  300 mG/codeine 30 mG 1 Tablet(s) Oral every 4 hours PRN Moderate Pain (4 - 6)  HYDROmorphone  Injectable 1 milliGRAM(s) IV Push every 4 hours PRN Severe Pain (7 - 10)  ondansetron Injectable 4 milliGRAM(s) IV Push every 6 hours PRN Nausea  oxycodone    5 mG/acetaminophen 325 mG 1 Tablet(s) Oral every 4 hours PRN Moderate Pain (4 - 6)      Physical Exam:  Constitutional: NAD  Pulmonary: clear to auscultation bilaterally   Cardiovascular: Regular rate and rhythm   Abdomen: soft, non-tender, non-distended, normal bowel sounds  Extremities: no lower extremity edema or calve tenderness, Casey's sign negative.  Incision: Clean, dry, intact.  Without signs of infection or hernia.    LABS:                        12.9   9.77  )-----------( 202      ( 02 Mar 2022 12:50 )             38.3     03-02    141  |  108  |  12  ----------------------------<  149<H>  3.6   |  28  |  0.68    Ca    8.6      02 Mar 2022 12:50            RADIOLOGY & ADDITIONAL TESTS:    A/P:   Cancer type:   Neuro: Pain well controlled. Continue current pain regimen.  CV: No history of cardiovascular disease. Blood pressure well controlled.  Pulm: No active disease. Saturating well on room air. Incentive spirometer use encouraged  GI: No active disease. Bowel sounds and function normal, tolerating PO diet. Continue current bowel regimen.   : Crawley removed, voiding spontaneously.  Heme: Hgb _ -> AM labs pending  ID: Afebrile. No antibiotics indicated at this time.   Endo: No active disease.   FEN: IVF at _. Will discontinue IVF when tolerating PO. Electrolytes WNL. AM labs pending.   Skin: No active disease.   Psych: No active disease.   DVT ppx: Ambulation encouraged, SCDs when in bed, lovenox ordered.  Dispo: Pending labs and AM rounds.    GYNECOLOGIC PROGRESS NOTE    POD#1    PROBLEMS:  RUST SYNDROME     Pt seen and examined at bedside.     SUBJECTIVE:  Patient is without complaints except some left groin pain  Pain well-controlled.  Flatus: No  Denies Nausea, Vomiting or Diarrhea.  Denies shortness of breath, chest pain or dyspnea on exertion.  Tolerating diet.    OBJECTIVE:     VITALS:  T(F): 98.5 (03-03-22 @ 05:18), Max: 98.5 (03-03-22 @ 05:18)  HR: 104 (03-03-22 @ 05:18) (84 - 111)  BP: 99/50 (03-03-22 @ 05:18) (99/50 - 126/80)  RR: 18 (03-03-22 @ 05:18) (10 - 18)  SpO2: 95% (03-03-22 @ 05:18) (95% - 100%)    I&O's Summary    02 Mar 2022 07:01  -  03 Mar 2022 06:54  --------------------------------------------------------  IN: 602 mL / OUT: 1375 mL / NET: -773 mL        MEDICATIONS  (STANDING):    MEDICATIONS  (PRN):  acetaminophen     Tablet .. 650 milliGRAM(s) Oral every 6 hours PRN Mild Pain (1 - 3)  acetaminophen  300 mG/codeine 30 mG 1 Tablet(s) Oral every 4 hours PRN Moderate Pain (4 - 6)  HYDROmorphone  Injectable 1 milliGRAM(s) IV Push every 4 hours PRN Severe Pain (7 - 10)  ondansetron Injectable 4 milliGRAM(s) IV Push every 6 hours PRN Nausea  oxycodone    5 mG/acetaminophen 325 mG 1 Tablet(s) Oral every 4 hours PRN Moderate Pain (4 - 6)      Physical Exam:  Constitutional: NAD  Pulmonary: clear to auscultation bilaterally   Cardiovascular: Regular rate and rhythm   Abdomen: soft, non-tender, non-distended, normal bowel sounds  Extremities: no lower extremity edema or calve tenderness, Casey's sign negative.  Incision: Clean, dry, intact.  Without signs of infection or hernia.    LABS:                        12.9   9.77  )-----------( 202      ( 02 Mar 2022 12:50 )             38.3     03-02    141  |  108  |  12  ----------------------------<  149<H>  3.6   |  28  |  0.68    Ca    8.6      02 Mar 2022 12:50

## 2022-03-03 NOTE — PROGRESS NOTE ADULT - ASSESSMENT
PT STABLE POD 1. DISCUSSED SURGERY, FINDINGS AND POSTOP CARE PLAN. DISCHARGE TODAY A/P: 46yo POD#1 s/p RA TLH BSO, cysto and TOT sling. Uncomplicated.     Neuro: Pain well controlled. Continue current pain regimen.  CV: No history of cardiovascular disease. Blood pressure well controlled (99/50 - 126/80).  Pulm: No active disease. Saturating well on room air. Incentive spirometer use encouraged  GI: No active disease. Bowel sounds and function normal, tolerating PO diet. Continue current bowel regimen.   : Crawley removed, voiding spontaneously.  Heme: Hgb 12.9 -> AM labs pending  ID: Afebrile. No antibiotics indicated at this time.   Endo: No active disease.   FEN: Electrolytes WNL. AM labs pending.   Skin: No active disease.   Psych: No active disease.   DVT ppx: Ambulation encouraged, SCDs when in bed, lovenox ordered.  Dispo: Pending labs     PT STABLE POD 1. DISCUSSED SURGERY, FINDINGS AND POSTOP CARE PLAN. DISCHARGE TODAY

## 2022-03-03 NOTE — DISCHARGE NOTE NURSING/CASE MANAGEMENT/SOCIAL WORK - PATIENT PORTAL LINK FT
You can access the FollowMyHealth Patient Portal offered by [facility] by registering at the following website: [email or web address]. By joining [employer]’s FollowMyHealth portal, you will also be able to view your health information using other applications (apps) compatible with our system.

## 2022-03-10 DIAGNOSIS — N39.3 STRESS INCONTINENCE (FEMALE) (MALE): ICD-10-CM

## 2022-03-10 DIAGNOSIS — Z15.09 GENETIC SUSCEPTIBILITY TO OTHER MALIGNANT NEOPLASM: ICD-10-CM

## 2022-03-10 DIAGNOSIS — D25.9 LEIOMYOMA OF UTERUS, UNSPECIFIED: ICD-10-CM

## 2022-03-10 DIAGNOSIS — J45.909 UNSPECIFIED ASTHMA, UNCOMPLICATED: ICD-10-CM

## 2022-03-10 DIAGNOSIS — N72 INFLAMMATORY DISEASE OF CERVIX UTERI: ICD-10-CM

## 2022-03-10 DIAGNOSIS — N83.12 CORPUS LUTEUM CYST OF LEFT OVARY: ICD-10-CM

## 2022-03-10 DIAGNOSIS — N88.8 OTHER SPECIFIED NONINFLAMMATORY DISORDERS OF CERVIX UTERI: ICD-10-CM

## 2022-03-10 DIAGNOSIS — Z91.040 LATEX ALLERGY STATUS: ICD-10-CM

## 2022-03-10 DIAGNOSIS — N84.0 POLYP OF CORPUS UTERI: ICD-10-CM

## 2022-03-10 DIAGNOSIS — E78.5 HYPERLIPIDEMIA, UNSPECIFIED: ICD-10-CM

## 2022-03-10 DIAGNOSIS — Z87.891 PERSONAL HISTORY OF NICOTINE DEPENDENCE: ICD-10-CM

## 2022-03-10 DIAGNOSIS — N83.11 CORPUS LUTEUM CYST OF RIGHT OVARY: ICD-10-CM

## 2022-03-10 DIAGNOSIS — D86.9 SARCOIDOSIS, UNSPECIFIED: ICD-10-CM

## 2022-03-14 ENCOUNTER — APPOINTMENT (OUTPATIENT)
Dept: OBGYN | Facility: CLINIC | Age: 47
End: 2022-03-14
Payer: COMMERCIAL

## 2022-03-14 VITALS
BODY MASS INDEX: 28.88 KG/M2 | WEIGHT: 163 LBS | SYSTOLIC BLOOD PRESSURE: 110 MMHG | RESPIRATION RATE: 14 BRPM | HEIGHT: 63 IN | HEART RATE: 75 BPM | DIASTOLIC BLOOD PRESSURE: 70 MMHG

## 2022-03-14 PROBLEM — Z15.09 GENETIC SUSCEPTIBILITY TO OTHER MALIGNANT NEOPLASM: Chronic | Status: ACTIVE | Noted: 2022-02-22

## 2022-03-14 PROBLEM — D25.9 LEIOMYOMA OF UTERUS, UNSPECIFIED: Chronic | Status: ACTIVE | Noted: 2022-02-22

## 2022-03-14 PROBLEM — D86.9 SARCOIDOSIS, UNSPECIFIED: Chronic | Status: ACTIVE | Noted: 2022-02-22

## 2022-03-14 PROCEDURE — 99024 POSTOP FOLLOW-UP VISIT: CPT

## 2022-03-14 NOTE — HISTORY OF PRESENT ILLNESS
[Pain is well-controlled] : pain is well-controlled [Fever] : no fever [Chills] : no chills [Nausea] : no nausea [Vomiting] : no vomiting [Clean/Dry/Intact] : clean, dry and intact [Erythema] : not erythematous [None] : no vaginal bleeding [Normal] : normal [Pathology reviewed] : pathology reviewed [de-identified] : AYAZ/JOAO O [de-identified] : PT WITH LEFT LEG ANTERIOR DISCOMFORT. DENIES URINE LOSS [de-identified] : VAGINAL INCISION WELL HEALED. NO PAIN IN THIGH WHEN PALPATING SLING

## 2022-03-14 NOTE — PLAN
[de-identified] : FOLLOW UP ONE MONTH [FreeTextEntry1] : PHYSICAL EXAMINATION PERFORMED... THE PATHOLOGY WAS REVIEWED. POSTOPERATIVE INSTRUCTIONS, RESTRICTIONS AND LIMITATIONS DISCUSSED. FOLLOWUP EXAM SCHEDULED. DISCUSSED IRON REPLACEMENT

## 2022-03-31 ENCOUNTER — NON-APPOINTMENT (OUTPATIENT)
Age: 47
End: 2022-03-31

## 2022-04-19 ENCOUNTER — APPOINTMENT (OUTPATIENT)
Dept: OBGYN | Facility: CLINIC | Age: 47
End: 2022-04-19
Payer: COMMERCIAL

## 2022-04-19 VITALS
DIASTOLIC BLOOD PRESSURE: 70 MMHG | SYSTOLIC BLOOD PRESSURE: 110 MMHG | TEMPERATURE: 98.6 F | BODY MASS INDEX: 28.35 KG/M2 | WEIGHT: 160 LBS | HEIGHT: 63 IN

## 2022-04-19 PROCEDURE — 99024 POSTOP FOLLOW-UP VISIT: CPT

## 2022-04-19 NOTE — HISTORY OF PRESENT ILLNESS
[Pain is well-controlled] : pain is well-controlled [Fever] : no fever [Chills] : no chills [Nausea] : no nausea [Vomiting] : no vomiting [Clean/Dry/Intact] : clean, dry and intact [Erythema] : not erythematous [None] : no vaginal bleeding [Normal] : normal [Pathology reviewed] : pathology reviewed [de-identified] : vaginal cuff intact

## 2022-04-19 NOTE — PLAN
[de-identified] : follow up 3 months [FreeTextEntry1] : PATIENT PRESENTS FOR POSTOP VISIT. DISCUSSED POSTOP RESTRICTIONS AND LIMITATIONS. ADVISED TO CALL WITH ANY UNEXPECTED BLEEDING OR PAIN. PATIENT TO FOLLOWUP IN 3 MONTHS TIME. DISCUSSED FOLLOWUP CARE AND RESTRICTIONS

## 2022-05-11 ENCOUNTER — TRANSCRIPTION ENCOUNTER (OUTPATIENT)
Age: 47
End: 2022-05-11

## 2022-05-11 ENCOUNTER — EMERGENCY (EMERGENCY)
Facility: HOSPITAL | Age: 47
LOS: 0 days | Discharge: HOME | End: 2022-05-11
Attending: EMERGENCY MEDICINE
Payer: COMMERCIAL

## 2022-05-11 VITALS
OXYGEN SATURATION: 99 % | TEMPERATURE: 101 F | DIASTOLIC BLOOD PRESSURE: 83 MMHG | RESPIRATION RATE: 20 BRPM | HEART RATE: 107 BPM | SYSTOLIC BLOOD PRESSURE: 121 MMHG

## 2022-05-11 VITALS — WEIGHT: 154.98 LBS | HEIGHT: 63 IN

## 2022-05-11 DIAGNOSIS — Z98.890 OTHER SPECIFIED POSTPROCEDURAL STATES: ICD-10-CM

## 2022-05-11 DIAGNOSIS — D25.9 LEIOMYOMA OF UTERUS, UNSPECIFIED: ICD-10-CM

## 2022-05-11 DIAGNOSIS — K08.409 PARTIAL LOSS OF TEETH, UNSPECIFIED CAUSE, UNSPECIFIED CLASS: Chronic | ICD-10-CM

## 2022-05-11 DIAGNOSIS — Z91.040 LATEX ALLERGY STATUS: ICD-10-CM

## 2022-05-11 DIAGNOSIS — D86.9 SARCOIDOSIS, UNSPECIFIED: ICD-10-CM

## 2022-05-11 DIAGNOSIS — Z88.8 ALLERGY STATUS TO OTHER DRUGS, MEDICAMENTS AND BIOLOGICAL SUBSTANCES: ICD-10-CM

## 2022-05-11 DIAGNOSIS — E78.5 HYPERLIPIDEMIA, UNSPECIFIED: ICD-10-CM

## 2022-05-11 DIAGNOSIS — E04.1 NONTOXIC SINGLE THYROID NODULE: ICD-10-CM

## 2022-05-11 DIAGNOSIS — U07.1 COVID-19: ICD-10-CM

## 2022-05-11 DIAGNOSIS — K08.409 PARTIAL LOSS OF TEETH, UNSPECIFIED CAUSE, UNSPECIFIED CLASS: ICD-10-CM

## 2022-05-11 DIAGNOSIS — Z98.890 OTHER SPECIFIED POSTPROCEDURAL STATES: Chronic | ICD-10-CM

## 2022-05-11 DIAGNOSIS — Z91.018 ALLERGY TO OTHER FOODS: ICD-10-CM

## 2022-05-11 DIAGNOSIS — Z15.09 GENETIC SUSCEPTIBILITY TO OTHER MALIGNANT NEOPLASM: ICD-10-CM

## 2022-05-11 DIAGNOSIS — J45.909 UNSPECIFIED ASTHMA, UNCOMPLICATED: ICD-10-CM

## 2022-05-11 PROCEDURE — 93010 ELECTROCARDIOGRAM REPORT: CPT

## 2022-05-11 PROCEDURE — 99284 EMERGENCY DEPT VISIT MOD MDM: CPT

## 2022-05-11 PROCEDURE — M0222: CPT

## 2022-05-11 PROCEDURE — 93005 ELECTROCARDIOGRAM TRACING: CPT

## 2022-05-11 RX ORDER — BEBTELOVIMAB 87.5 MG/ML
175 INJECTION, SOLUTION INTRAVENOUS ONCE
Refills: 0 | Status: COMPLETED | OUTPATIENT
Start: 2022-05-11 | End: 2022-05-11

## 2022-05-11 RX ORDER — ACETAMINOPHEN 500 MG
650 TABLET ORAL ONCE
Refills: 0 | Status: DISCONTINUED | OUTPATIENT
Start: 2022-05-11 | End: 2022-05-11

## 2022-05-11 RX ADMIN — BEBTELOVIMAB 175 MILLIGRAM(S): 87.5 INJECTION, SOLUTION INTRAVENOUS at 15:41

## 2022-05-11 NOTE — ED STATDOCS - PROGRESS NOTE DETAILS
risks/benefits of MAB d/w pt, pt consented to treatment, signed consent in chart  Adelaide Castañeda PA-C after injection of MAB pt c/o chest tightness, feeling flushed and dizzy, denies itchy, SOB, throat or tongue itching, swelling, pt evaluated, no rashes, throat or tongue swelling noted, lungs CTA b/l, sx resolved within a few minutes  Adelaide Castañeda PA-C pt feeling well, no sx currently, VSS, will d/c home with PMD f/u   Adelaide Castañeda PA-C

## 2022-05-11 NOTE — ED STATDOCS - NSICDXPASTSURGICALHX_GEN_ALL_CORE_FT
PAST SURGICAL HISTORY:  History of bronchoscopy EBUS    S/P dilation and curettage TOP 1993    [place] teeth extracted

## 2022-05-11 NOTE — ED STATDOCS - NSFOLLOWUPINSTRUCTIONS_ED_ALL_ED_FT
Bebtelovimab (By injection)  Bebtelovimab (otu-yf-OIK-vi-mab)    Treats coronavirus disease 2019 (COVID-19).    Brand Name(s):  There may be other brand names for this medicine.    When This Medicine Should Not Be Used:  This medicine is not right for everyone. You should not receive it if you had an allergic reaction to bebtelovimab.    How to Use This Medicine:  Injectable  •Your doctor will prescribe your dose and schedule. This medicine is given through a needle placed in a vein. It must be injected slowly, so your IV tube will need to stay in place for at least 30 seconds.  •A nurse or other health provider will give you this medicine.   •You should receive this medicine within 7 days of the onset of COVID-19 symptoms.  •This medicine comes with a Fact Sheet for Patients, Parents, and Caregivers. Read and follow the information in the Fact Sheet carefully. Ask your doctor if you have any questions.    Drugs and Foods to Avoid:        Ask your doctor or pharmacist before using any other medicine, including over-the-counter medicines, vitamins, and herbal products.      Warnings While Using This Medicine:  •Tell your doctor if you are pregnant or breastfeeding.  •This medicine may cause serious infusion-related reactions, which can be life-threatening.  •Your doctor will do lab tests at regular visits to check on the effects of this medicine. Keep all appointments.     Possible Side Effects While Using This Medicine:  Call your doctor right away if you notice any of these side effects:  •Allergic reaction: Itching or hives, swelling in your face or hands, swelling or tingling in your mouth or throat, chest tightness, trouble breathing  •Fever, chills, sweating, lightheadedness, dizziness, fainting, fast, slow, or uneven heartbeat    If you notice these less serious side effects, talk with your doctor:  •Pain, itching, burning, swelling, or a lump under your skin where the needle is placed    If you notice other side effects that you think are caused by this medicine, tell your doctor.  Call your doctor for medical advice about side effects. You may report side effects to FDA at 0-483-FND-8694

## 2022-05-11 NOTE — ED ADULT TRIAGE NOTE - CHIEF COMPLAINT QUOTE
Patient presents complaining of covid sx. tested positive at urgent care. came to ED for MAB. denies SOB.

## 2022-05-11 NOTE — ED ADULT NURSE REASSESSMENT NOTE - NS ED NURSE REASSESS COMMENT FT1
Patient complained of feeling hot, itching to right upper arm, feeling warm all over, chest tightness, and lightheaded. Patient denies shortness of breath, throat swelling, rash, itchiness anywhere else on body. Stayed with patient, ADITYA Castañeda made aware and came to bedside. Patient's VS as charted, and symptoms began to subside. Will continue to monitor patient and re check vital signs.

## 2022-05-11 NOTE — ED STATDOCS - OBJECTIVE STATEMENT
48 y/o female with a PMHx of asthma, gestational diabetes, HLD, Sanchez syndrome, sarcoidosis, thyroid nodule, uterine fibroid presents to the ED c/o dry cough, congestion, chills, fatigue since yesterday. Pt tested COVID + at [facility] MD today. Pt came to ED for MAB. No other complaints at this time.

## 2022-05-11 NOTE — ED ADULT NURSE NOTE - NSICDXPASTSURGICALHX_GEN_ALL_CORE_FT
PAST SURGICAL HISTORY:  History of bronchoscopy EBUS    S/P dilation and curettage TOP 1993    [place] teeth extracted

## 2022-05-11 NOTE — ED STATDOCS - PATIENT PORTAL LINK FT
You can access the FollowMyHealth Patient Portal offered by [facility] by registering at the following website: [email or web address]. By joining [employer]’s FollowMyHealth portal, you will also be able to view your health information using other applications (apps) compatible with our system.

## 2022-05-31 ENCOUNTER — FORM ENCOUNTER (OUTPATIENT)
Age: 47
End: 2022-05-31

## 2022-06-07 ENCOUNTER — TRANSCRIPTION ENCOUNTER (OUTPATIENT)
Age: 47
End: 2022-06-07

## 2022-07-26 ENCOUNTER — APPOINTMENT (OUTPATIENT)
Dept: OBGYN | Facility: CLINIC | Age: 47
End: 2022-07-26

## 2022-08-24 ENCOUNTER — APPOINTMENT (OUTPATIENT)
Dept: FAMILY MEDICINE | Facility: CLINIC | Age: 47
End: 2022-08-24

## 2022-08-29 ENCOUNTER — TRANSCRIPTION ENCOUNTER (OUTPATIENT)
Age: 47
End: 2022-08-29

## 2022-10-19 ENCOUNTER — APPOINTMENT (OUTPATIENT)
Dept: ORTHOPEDIC SURGERY | Facility: CLINIC | Age: 47
End: 2022-10-19

## 2022-10-19 VITALS — HEIGHT: 63 IN | WEIGHT: 160 LBS | BODY MASS INDEX: 28.35 KG/M2

## 2022-10-19 DIAGNOSIS — M50.20 OTHER CERVICAL DISC DISPLACEMENT, UNSPECIFIED CERVICAL REGION: ICD-10-CM

## 2022-10-19 PROCEDURE — 99072 ADDL SUPL MATRL&STAF TM PHE: CPT

## 2022-10-19 PROCEDURE — 99203 OFFICE O/P NEW LOW 30 MIN: CPT

## 2022-10-19 RX ORDER — OXYCODONE AND ACETAMINOPHEN 5; 325 MG/1; MG/1
5-325 TABLET ORAL EVERY 4 HOURS
Qty: 30 | Refills: 0 | Status: DISCONTINUED | COMMUNITY
Start: 2022-02-22 | End: 2022-10-19

## 2022-10-19 NOTE — HISTORY OF PRESENT ILLNESS
[2] : 2 [Radiating] : radiating [Tingling] : tingling [Intermittent] : intermittent [Nothing helps with pain getting better] : Nothing helps with pain getting better [Full time] : Work status: full time [de-identified] : 46 y/o female presents for her initial consultation. Patient reports a 3 month history of neck pain radiating into her bilateral scapula. Denies any specific inciting event. 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 Patient was previously treated by Dr. Kenney who prescribed muscle relaxants and steroids with improvement. Patient has also started PT with improvement. Patient rates her pain as a 2/10.   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

## 2022-10-19 NOTE — ASSESSMENT
[FreeTextEntry1] : 46 y/o female with cervical myelopathy. Patient has a very mild narrowing of her spinal canal, and no neurologic deficit, as a result, I advised the patient that she doesn't require any pain management or surgical intervention at this time, and that we will be routinely monitoring her. Patient was counseled regarding Red Flag Signs, and when to seek urgent care. I would like to follow up with the patient in 6 months to re-evaluate the status of their condition. \par \par Prior to appointment and during encounter with patient extensive medical records were reviewed including but not limited to, hospital records, out patient records, imaging results, and lab data. During this appointment the patient was examined, diagnoses were discussed and explained in a face to face manner. In addition extensive time was spent reviewing aforementioned diagnostic studies. Counseling including abnormal image results, differential diagnoses, treatment options, risk and benefits, lifestyle changes, current condition, and current medications was performed. Patient's comments, questions, and concerns were address and patient verbalized understanding. Based on this patient's presentation at our office, which is an orthopedic spine surgeon's office, this patient inherently / intrinsically has a risk, however minute, of developing issues such as Cauda equina syndrome, bowel and bladder changes, or progression of motor or neurological deficits such as paralysis which may be permanent.\par \par I, Segun Gaines, attest that this documentation has been prepared under the direction and in the presence of provider Davion Dave MD.

## 2022-10-19 NOTE — DATA REVIEWED
[MRI] : MRI [Cervical Spine] : cervical spine [I independently reviewed and interpreted images and report] : I independently reviewed and interpreted images and report [FreeTextEntry1] : Cervical spine MRI (09/07/22) - [facility] \par On my interpretation of the images\par C2-3 disc bulge\par C3-4 moderate disc degeneraiton, central disc extrusion with mild spinal cord compression, no T1/2 Signal changes, bilateral moderate foraminal stenosis \par C4-5 moderate disc degeneraiton, central disc protrusion, bilateral foraminal stneosis \par C5-6 mild disc degeneration with mild to moderate bilateral foraminal stenosis \par C6-7 moderate disc degeneration, moderate right mild left foraminal stenosis

## 2022-10-19 NOTE — PHYSICAL EXAM
[NL (45)] : right lateral flexion 45 degrees [NL (80)] : right lateral rotation 80 degrees [5___] : right grasp 5[unfilled]/5 [Biceps 2+] : biceps 2+ [Triceps 2+] : triceps 2+ [Brachioradialis 2+] : brachioradialis 2+ [de-identified] : Constitutional:\par -  General Appearance: \par Unremarkable\par Body Habitus\par Well Developed \par Well Nourished\par Body Habitus\par No Deformities\par Well Groomed\par \par Ability To communicate:\par Normal\par \par Neurologic: \par Global sensation is intact to upper and lower extremities.  See examination of Neck and/or Spine for exceptions.\par Orientation to Time, Place and Person is: Normal\par Mood And Affect is Normal\par \par Skin:\par -  Head/Face, Right Upper/Lower Extremity, Left Upper/Lower Extremity: Normal\par See Examination of Neck and/or Spine for exceptions\par \par Cardiovascular:\par Peripheral Cardiovascular System is Normal\par \par Palpation of Lymph Nodes:\par Normal Palpation of lymph nodes in: Axilla, Cervical, Inguinal\par Abnormal Palpation of lymph nodes in: None  [] : negative Oliver reflex [de-identified] : 3+ Right brachioradialis reflex  [TWNoteComboBox7] : forward flexion 20 degrees [de-identified] : extension 30 degrees

## 2022-11-30 ENCOUNTER — APPOINTMENT (OUTPATIENT)
Dept: CARDIOLOGY | Facility: CLINIC | Age: 47
End: 2022-11-30

## 2022-11-30 ENCOUNTER — NON-APPOINTMENT (OUTPATIENT)
Age: 47
End: 2022-11-30

## 2022-11-30 VITALS
BODY MASS INDEX: 28.35 KG/M2 | SYSTOLIC BLOOD PRESSURE: 116 MMHG | DIASTOLIC BLOOD PRESSURE: 77 MMHG | WEIGHT: 160 LBS | HEART RATE: 89 BPM | OXYGEN SATURATION: 94 % | HEIGHT: 63 IN

## 2022-11-30 PROCEDURE — 99214 OFFICE O/P EST MOD 30 MIN: CPT

## 2022-11-30 PROCEDURE — 93000 ELECTROCARDIOGRAM COMPLETE: CPT

## 2022-11-30 RX ORDER — PRAVASTATIN SODIUM 40 MG/1
40 TABLET ORAL
Qty: 90 | Refills: 3 | Status: DISCONTINUED | COMMUNITY
End: 2022-11-30

## 2022-11-30 NOTE — DISCUSSION/SUMMARY
[FreeTextEntry1] : 47  year woman with a history as listed presents for a followup visit. \par Alayna is has mild dyspnea likely from her underlying pulmonary disease. She denies any anginal symptoms. Clinically she is euvolemic on exam. Her EKG did not reveal any significant ischemic changes. \par  She had a PET scan 9/11/19 that did not show any signs of cardiac sarcoid. She had an event monitor which was negative for any arrhythmias or signs of heart block.  \par She will reattempt Pravastatin 40mg HS given hyperlipidemia this tme with Coq10. \par Exercise and diet counseling was performed in order to reduce her future cardiovascular risk.\par She will followup with me in 6  months or sooner if necessary.  [EKG obtained to assist in diagnosis and management of assessed problem(s)] : EKG obtained to assist in diagnosis and management of assessed problem(s)

## 2022-11-30 NOTE — HISTORY OF PRESENT ILLNESS
[FreeTextEntry1] : 47 F with past medical history of Sarcoid , normal LV function, family history of early CAD, Sanchez syndrome. 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 She had a cardiac MR in 4/2019 showing questionable subtle subepicardial enhancement along the LV basal to mid inferior wall with adjacent small pericardial effusion favoring a sequalla of prior mild myocarditis although cannot exclude subtle involvement of sarcoidosis. She had a PET scan 9/11/19 that did not show any signs of cardiac sarcoid. She had an event monitor which was negative for any arrhythmias or signs of heart block. \par \par She is here for a followup visit. \par Since her last visit her hilar adenopathy had improved based on her last CT.  She is not formally exercising but remaining active with her work and yard work. \par She has mild dyspnea. She   denies any chest pain, PND, orthopnea, lower extremity edema, near syncope, syncope, strokelike symptoms. She denies any palpitations.\par \par She stopped her pravastatin because of lower ext (knee) pain which has improved once she stopped it.

## 2022-11-30 NOTE — CARDIOLOGY SUMMARY
[de-identified] : Sinus  Rhythm \par -Right atrial enlargement. \par  [de-identified] : 11/10/21 9 METs average no ischemic  [de-identified] : 11/10/21 normal LV function.

## 2022-12-22 ENCOUNTER — EMERGENCY (EMERGENCY)
Facility: HOSPITAL | Age: 47
LOS: 0 days | Discharge: HOME | End: 2022-12-22
Attending: EMERGENCY MEDICINE
Payer: COMMERCIAL

## 2022-12-22 VITALS
HEART RATE: 73 BPM | OXYGEN SATURATION: 98 % | RESPIRATION RATE: 18 BRPM | SYSTOLIC BLOOD PRESSURE: 120 MMHG | TEMPERATURE: 98 F | DIASTOLIC BLOOD PRESSURE: 86 MMHG

## 2022-12-22 VITALS — WEIGHT: 154.98 LBS | HEIGHT: 63 IN

## 2022-12-22 DIAGNOSIS — Z98.890 OTHER SPECIFIED POSTPROCEDURAL STATES: Chronic | ICD-10-CM

## 2022-12-22 DIAGNOSIS — K08.409 PARTIAL LOSS OF TEETH, UNSPECIFIED CAUSE, UNSPECIFIED CLASS: Chronic | ICD-10-CM

## 2022-12-22 LAB
ALBUMIN SERPL ELPH-MCNC: 3.9 G/DL — SIGNIFICANT CHANGE UP (ref 3.3–5)
ALP SERPL-CCNC: 81 U/L — SIGNIFICANT CHANGE UP (ref 40–120)
ALT FLD-CCNC: 25 U/L — SIGNIFICANT CHANGE UP (ref 12–78)
ANION GAP SERPL CALC-SCNC: 2 MMOL/L — LOW (ref 5–17)
AST SERPL-CCNC: 22 U/L — SIGNIFICANT CHANGE UP (ref 15–37)
BASOPHILS # BLD AUTO: 0.04 K/UL — SIGNIFICANT CHANGE UP (ref 0–0.2)
BASOPHILS NFR BLD AUTO: 0.7 % — SIGNIFICANT CHANGE UP (ref 0–2)
BILIRUB SERPL-MCNC: 0.9 MG/DL — SIGNIFICANT CHANGE UP (ref 0.2–1.2)
BUN SERPL-MCNC: 21 MG/DL — SIGNIFICANT CHANGE UP (ref 7–23)
CALCIUM SERPL-MCNC: 9.1 MG/DL — SIGNIFICANT CHANGE UP (ref 8.5–10.1)
CHLORIDE SERPL-SCNC: 106 MMOL/L — SIGNIFICANT CHANGE UP (ref 96–108)
CO2 SERPL-SCNC: 31 MMOL/L — SIGNIFICANT CHANGE UP (ref 22–31)
CREAT SERPL-MCNC: 0.79 MG/DL — SIGNIFICANT CHANGE UP (ref 0.5–1.3)
EGFR: 92 ML/MIN/1.73M2 — SIGNIFICANT CHANGE UP
EOSINOPHIL # BLD AUTO: 0.24 K/UL — SIGNIFICANT CHANGE UP (ref 0–0.5)
EOSINOPHIL NFR BLD AUTO: 4.4 % — SIGNIFICANT CHANGE UP (ref 0–6)
GLUCOSE SERPL-MCNC: 96 MG/DL — SIGNIFICANT CHANGE UP (ref 70–99)
HCT VFR BLD CALC: 41.6 % — SIGNIFICANT CHANGE UP (ref 34.5–45)
HGB BLD-MCNC: 14.2 G/DL — SIGNIFICANT CHANGE UP (ref 11.5–15.5)
HIV 1 & 2 AB SERPL IA.RAPID: SIGNIFICANT CHANGE UP
IMM GRANULOCYTES NFR BLD AUTO: 0.2 % — SIGNIFICANT CHANGE UP (ref 0–0.9)
LYMPHOCYTES # BLD AUTO: 2.05 K/UL — SIGNIFICANT CHANGE UP (ref 1–3.3)
LYMPHOCYTES # BLD AUTO: 37.7 % — SIGNIFICANT CHANGE UP (ref 13–44)
MCHC RBC-ENTMCNC: 31.2 PG — SIGNIFICANT CHANGE UP (ref 27–34)
MCHC RBC-ENTMCNC: 34.1 GM/DL — SIGNIFICANT CHANGE UP (ref 32–36)
MCV RBC AUTO: 91.4 FL — SIGNIFICANT CHANGE UP (ref 80–100)
MONOCYTES # BLD AUTO: 0.51 K/UL — SIGNIFICANT CHANGE UP (ref 0–0.9)
MONOCYTES NFR BLD AUTO: 9.4 % — SIGNIFICANT CHANGE UP (ref 2–14)
NEUTROPHILS # BLD AUTO: 2.59 K/UL — SIGNIFICANT CHANGE UP (ref 1.8–7.4)
NEUTROPHILS NFR BLD AUTO: 47.6 % — SIGNIFICANT CHANGE UP (ref 43–77)
PLATELET # BLD AUTO: 250 K/UL — SIGNIFICANT CHANGE UP (ref 150–400)
POTASSIUM SERPL-MCNC: 4 MMOL/L — SIGNIFICANT CHANGE UP (ref 3.5–5.3)
POTASSIUM SERPL-SCNC: 4 MMOL/L — SIGNIFICANT CHANGE UP (ref 3.5–5.3)
PROT SERPL-MCNC: 7.8 GM/DL — SIGNIFICANT CHANGE UP (ref 6–8.3)
RBC # BLD: 4.55 M/UL — SIGNIFICANT CHANGE UP (ref 3.8–5.2)
RBC # FLD: 11.8 % — SIGNIFICANT CHANGE UP (ref 10.3–14.5)
SODIUM SERPL-SCNC: 139 MMOL/L — SIGNIFICANT CHANGE UP (ref 135–145)
WBC # BLD: 5.44 K/UL — SIGNIFICANT CHANGE UP (ref 3.8–10.5)
WBC # FLD AUTO: 5.44 K/UL — SIGNIFICANT CHANGE UP (ref 3.8–10.5)

## 2022-12-22 PROCEDURE — 85025 COMPLETE CBC W/AUTO DIFF WBC: CPT

## 2022-12-22 PROCEDURE — 36415 COLL VENOUS BLD VENIPUNCTURE: CPT

## 2022-12-22 PROCEDURE — 99283 EMERGENCY DEPT VISIT LOW MDM: CPT

## 2022-12-22 PROCEDURE — 80053 COMPREHEN METABOLIC PANEL: CPT

## 2022-12-22 PROCEDURE — 80074 ACUTE HEPATITIS PANEL: CPT

## 2022-12-22 PROCEDURE — 86703 HIV-1/HIV-2 1 RESULT ANTBDY: CPT

## 2022-12-22 PROCEDURE — 99284 EMERGENCY DEPT VISIT MOD MDM: CPT

## 2022-12-22 RX ORDER — EMTRICITABINE AND TENOFOVIR DISOPROXIL FUMARATE 200; 300 MG/1; MG/1
1 TABLET, FILM COATED ORAL
Qty: 30 | Refills: 0
Start: 2022-12-22 | End: 2023-01-20

## 2022-12-22 RX ORDER — DOLUTEGRAVIR SODIUM 25 MG/1
1 TABLET, FILM COATED ORAL
Qty: 30 | Refills: 0
Start: 2022-12-22 | End: 2023-01-20

## 2022-12-22 NOTE — ED STATDOCS - NS ED ROS FT
Constitutional: No fever or chills. +needle stick injury  Eyes: No visual changes  HEENT: No throat pain  CV: No chest pain  Resp: No SOB no cough  GI: No abd pain, nausea or vomiting  : No dysuria  MSK: No musculoskeletal pain  Skin: No rash  Neuro: No headache

## 2022-12-22 NOTE — ED STATDOCS - OBJECTIVE STATEMENT
48 y/o female PMHx of asthma, gestational diabetes, HLD, patrick syndrome, sarcoidosis, thyroid nodule, uterine fibroid presents to ED for needle stick injury on left 2nd finger on dorsal side. Pt reports while at work putting needle into sharps container pt reports stuck with dirty needle with visible blood on it, unknown which pt the needle was used on. Pt works at [employer] but was stuck at her other job at [employer], not at . Pt reports washing out using soap and water and alcohol wipe. Last tetanus shot 2018. Pt is a non-smoker, social drinker. Pt reports allergy to Benzonatate.

## 2022-12-22 NOTE — ED ADULT TRIAGE NOTE - NSTRIAGECARE_GEN_A_ER
Patient Education     Coronavirus Disease 2019 (COVID-19): Overview  Coronavirus disease 2019 (COVID-19) is an illness that infects the lungs. It's caused by a type of coronavirus called SARS-CoV-2. There are many types of coronaviruses. They are a very common cause of colds and bronchitis. They can cause a lung infection called pneumonia. Symptoms can range from mild to severe. Some people have no symptoms. These viruses are also found in some animals.  The virus that causes COVID-19 changes (mutates) all the time. This is what all viruses do. It leads to different versions of a virus. These are called variants. COVID-19 variants may spread more easily from person to person. They may cause milder or more severe symptoms.   How the virus spreads is not yet fully known. It seems to spread and infect people easily. Some people may not know how they were infected. The virus may spread through droplets of fluid that a person coughs or sneezes into the air. It may be spread if you touch a surface with the virus on it and then touch your eyes, nose, or mouth. Handles, knobs, and objects may have the virus on them.     To help prevent spreading the infection, wash your hands often, or use an alcohol-based hand .   For the latest information from the CDC:     · Go to the CDC website  · Call 560-PKL-ZKAR (888-069-8056)  What are the symptoms of COVID-19?  Some people have no symptoms. Some have mild symptoms. And other people may have severe symptoms. Types of symptoms can vary from person to person. They may appear 2 to 14 days after contact with the virus. They can include:  · Fever  · Chills  · Coughing  · Trouble breathing or feeling short of breath  · Sore throat  · Stuffy or runny nose  · Headache  · Body aches  · Tiredness  · Nausea, vomiting, diarrhea, or abdominal pain  · New loss of sense of smell or taste  Check your symptoms with the CDC’s Coronavirus Self-.  What are possible complications of  COVID-19?  The virus can cause an infection in both lungs called pneumonia. In some cases, this can lead to death. Experts are still learning more about COVID-19 complications. More may be linked to the virus over time.  Some people are at higher risk for complications. This includes:  · Older adults  · People with heart or lung disease  · People with diabetes or kidney disease  · People with health conditions that suppress the immune system  · People who take medicines that suppress the immune system  Rarely, a child may have a severe complication. This is called multisystem inflammatory syndrome in children (MIS-C). MIS-C seems to be like Kawasaki disease. This is a rare illness that causes inflammation of blood vessels and body organs. It's not yet known if MIS-C happens only in children. Experts don’t know if adults are also at risk. It's also not known if it's related to COVID-19. Many children with MIS-C have tested positive for COVID-19. But not all have tested positive. Experts continue to study MIS-C.   How is COVID-19 diagnosed?  Your healthcare provider will ask:  · What symptoms you have  · Where you live  · If you’ve traveled recently  · If you’ve had contact with sick people   You may have 1 of these tests for COVID-19:  · Viral (molecular) test. You may also hear this called an RT-PCR test. Viral tests are very accurate. A viral test looks for the DNA of the SARS-CoV-2 virus. A viral test can also find COVID-19 variants. There are a few ways to do this. A swab may be wiped inside your nose or throat. Or a long swab may be put into your nose down to the back of your throat. Or a sample of your saliva may be taken. Your test results may be back in about 30 minutes. This depends on the type of test. Some tests must be sent to a lab. These can take several days for the results. Home test kits are now available. Some of these need a prescription. If you use a home kit, follow the instructions in the kit  closely. Some kits show results quickly at home. Others must be sent to a lab for the results.  · Antigen test. This can find proteins from the SARS-CoV-2 virus. A swab may be wiped inside your nose or throat. Or a long swab may be put into your nose down to the back of your throat. Some results are back within 1 hour. This depends on the type of test. Positive results are very accurate. But false positive results can happen. This is more common in places where not many people have the virus. Antigen tests are more likely to miss a COVID-19 infection than a viral (molecular) test. If your antigen test is negative but you have symptoms of COVID-19, you may need to have a viral test.  If your provider thinks or confirms that you have COVID-19, you may have other tests. These tests may include:  · Antibody blood test. This type of test can show if you were infected with the virus in the past. It shows antibodies in the blood that give some immunity. The accuracy and availability of these tests vary. An antibody test may not be able to show if you have an infection right now. This is because it can take up to a few weeks for your body to make antibodies.  · Sputum culture. If you have a wet cough, you may be asked to cough up a small sample of mucus (sputum) from your lungs. This is tested for the virus. It may be tested for pneumonia.  · Imaging tests. You may have a chest X-ray or CT scan.  Can you get COVID-19 again?  At this time, it's not clear if people can get COVID-19 more than once. The CDC notes that if a person has recovered from COVID-19 and is tested again within 3 months, they may still have low levels of the virus in their body. This means they test positive for COVID-19, but are not spreading it. Experts just don’t yet know how long immunity lasts after you have the virus. They don’t know if you can get COVID-19 again.  Vaccines for COVID-19  The FDA has approved several vaccines to help prevent COVID-19  or reduce its severity. No vaccine is 100% effective at preventing an illness. Getting a vaccine is important. It can help prevent the spread of COVID-19 and its variants. It can help reduce the severity of COVID-19 if you get it. This can stop you from getting seriously ill. It can keep you from needing to go to the hospital. One vaccine has been approved for people as young as 12. Pregnant or breastfeeding people can be vaccinated. Ask your healthcare provider which vaccine may be best for you.  The vaccines are given as a shot (injection). This is most often done in a muscle in the upper arm. There are 1-dose and 2-dose vaccines. For the 2-dose vaccine, the second dose is given several weeks after the first. An extra dose of the 2-dose vaccine may be needed for some people who have had a solid organ transplant or who have a very weak immune system. It's given at least 28 days after the second dose. Talk with your healthcare provider about your situation and risk.  For normally healthy people who have gotten the vaccine, data show that protection may lessen over time. Health experts are exploring the need for a booster shot about 8 months after getting the 1-dose vaccine or 2nd shot of the 2-dose vaccine. Talk to your healthcare provider.  How is COVID-19 treated?  The most proven treatments right now are those to help your body while it fights the virus. This is known as supportive care. It includes:  · Getting rest. This helps your body fight the illness.  · Drinking fluids. Try to drink 6 to 8 glasses of fluids every day. Ask your provider which drinks are best for you. Don't have drinks with caffeine or alcohol.  · Taking over-the-counter (OTC) medicine. These are used to help ease pain and reduce fever. Ask your provider which OTC medicine is safe for you to use.  For severe illness, you may need to stay in the hospital. Your care may include:  · IV (intravenous) fluids. These are given through a vein. This  helps to replace fluids in your body.  · Oxygen. You may be given supplemental oxygen. Or you may be put on a breathing machine (ventilator). This is done so you get enough oxygen in your body.  · Prone positioning. Your healthcare team may regularly turn you on your stomach. This is called prone positioning. It helps increase the amount of oxygen you get to your lungs. Follow their instructions on position changes while you're in the hospital. Also follow their advice on the best positions to help your breathing once you go home.  · Remdesivir. This is an antiviral medicine. The FDA has approved it for use on COVID-19. It works by stopping the spread of the SARS-CoV-2 virus in the body. It's approved only for people who are in the hospital. It's for people 12 years and older who weigh at least 88 pounds (40 kgs). In some cases, it may also be used for people younger than 12 years or who weigh less than 88 pounds (40 kgs).  Experts are researching other types of treatment. These are still being tested. They are not widely used. These include:  · COVID-19 convalescent plasma. Plasma is the liquid part of blood. People who had COVID-19 may be asked to donate plasma. This is called COVID-19 convalescent plasma donation. The plasma may have antibodies that can help fight COVID-19 in people who are very ill with it. Experts don't know how well the plasma will work. They continue to research it. The FDA has approved it for emergency use in some people with severe COVID-19. Ask your provider if you qualify to donate.  · Monoclonal antibody therapy. The FDA approved this for emergency use in some people. They must have a positive COVID-19 viral test and have mild to moderate symptoms. They can’t be in the hospital. It's approved for people 12 years and older who weigh at least 88 pounds (40 kgs) and are at high risk for severe COVID-19 and a hospital stay. This includes people who are 65 years and older and people with some  chronic conditions. This therapy is not approved for people who are in the hospital with COVID-19 or need oxygen. Your healthcare team will tell you if you qualify.  Are you at risk for COVID-19?  You are at risk for COVID-19 if any of these apply to you:  · You live in an area with cases of COVID-19  · You traveled to an area with cases of COVID-19  · You had close contact with someone who had COVID-19  Close contact means being within 6 feet. This contact happens for 15 minutes or more. It may be short times of contact that add up to at least 15 minutes over a 24-hour period.  Keep in mind that COVID-19 may be spread by people who do not show symptoms.  Last updated: 8/25/2021  Nilo last reviewed this educational content on 1/1/2020  © 7695-8736 [employer]. All rights reserved. This information is not intended as a substitute for professional medical care. Always follow your healthcare professional's instructions.            Face Mask

## 2022-12-22 NOTE — ED STATDOCS - CARE PROVIDER_API CALL
Eliseo Stark  INFECTIOUS DISEASE  [address], [address]  [place] [address]  Phone: [phone]  Fax: [phone]  Follow Up Time:

## 2022-12-22 NOTE — ED STATDOCS - PROGRESS NOTE DETAILS
Pt. is a 7 year old female Hx asthma, HLD, patrick syndrome, hysterectomy,  sarcoidosis, presents with needle stick at work.  Pt. was stuck with a needle to left 2nd finger posteriorly.  Pt. was putting a needle into a sharps container.  Needle had visible blood on it.  Unknown source.  Pt. works at [employer].  Pt. washed wound with soap and water as well as alcohol.  Tdap 2018.  Nina Cormier PA-C Pt. deferring PEP however she requests send Rx to pharmacy.  Nina Cormier PA-C Pt. deferring PEP however she requests send Rx to pharmacy.   Hepatitis panel pending.   Pt. will follow with HR at her company.  Will also refer to ID.   Nina Cormier PA-C

## 2022-12-22 NOTE — ED STATDOCS - ATTENDING APP SHARED VISIT CONTRIBUTION OF CARE
I, Tonio Caraballo MD, personally saw the patient with ACP.  I have personally performed a face to face diagnostic evaluation on this patient.  I have reviewed the ACP note and agree with the history, exam, and plan of care, except as noted.   The initial assessment was performed by myself and then the patient was handed off to the ACP. The patient was followed and re-evaluated by the ACP. All labs, imaging and procedures were evaluated and performed by the ACP and I was available for consultation if any questions in the patients care came up.

## 2022-12-22 NOTE — ED STATDOCS - CLINICAL SUMMARY MEDICAL DECISION MAKING FREE TEXT BOX
48 y/o female presents for needle stick from unknown pt at [facility]. Discussed post-exposure prophylaxis, blood testing and follow up for repeat blood test. Pt to follow protocol at [facility]. 46 y/o female presents for needle stick from unknown pt at [facility]. Discussed post-exposure prophylaxis, blood testing and follow up for repeat blood test. Pt to follow protocol at [facility].          Pt. deferring PEP however she requests send Rx to pharmacy.   Hepatitis panel pending.   Pt. will follow with HR at her company.  Will also refer to ID.   Nina Cormier PA-C

## 2022-12-22 NOTE — ED ADULT TRIAGE NOTE - CHIEF COMPLAINT QUOTE
Pt reports to ed for needle stick injury. pt while at work pt reports stuck with dirty needle unknown pt source , on left 2nd finger on dorsal side. Pt reports washing out using soap and water and alcohol wipe.

## 2022-12-22 NOTE — ED STATDOCS - NS_ ATTENDINGSCRIBEDETAILS _ED_A_ED_FT
I, Tonio Caraballo MD,  performed the initial face to face bedside interview with this patient regarding history of present illness, review of symptoms and relevant past medical, social and family history.  I completed an independent physical examination.  I was the initial provider who evaluated this patient.   I personally saw the patient and performed a substantive portion of the visit including all aspects of the medical decision making.  The history, relevant review of systems, past medical and surgical history, medical decision making, and physical examination was documented by the scribe in my presence and I attest to the accuracy of the documentation.

## 2022-12-22 NOTE — ED ADULT NURSE NOTE - OBJECTIVE STATEMENT
Pt is a&o x 3, states needle stick injury at work from sharps container, IV inserted 20 G into L a/c, blood labs obtained and sent to lab.

## 2022-12-22 NOTE — ED STATDOCS - PATIENT PORTAL LINK FT
You can access the FollowMyHealth Patient Portal offered by [facility] by registering at the following website: [email or web address]. By joining [facility]’s FollowMyHealth portal, you will also be able to view your health information using other applications (apps) compatible with our system.

## 2022-12-22 NOTE — ED ADULT NURSE NOTE - NSICDXPASTSURGICALHX_GEN_ALL_CORE_FT
PAST SURGICAL HISTORY:  History of bronchoscopy EBUS    S/P dilation and curettage TOP 1993    [place] teeth extracted

## 2022-12-22 NOTE — ED STATDOCS - NSICDXPASTSURGICALHX_GEN_ALL_CORE_FT
PAST SURGICAL HISTORY:  History of bronchoscopy EBUS    S/P dilation and curettage TOP 1993    [place] teeth extracted

## 2022-12-23 DIAGNOSIS — Z91.018 ALLERGY TO OTHER FOODS: ICD-10-CM

## 2022-12-23 DIAGNOSIS — E78.5 HYPERLIPIDEMIA, UNSPECIFIED: ICD-10-CM
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
## 2022-12-23 DIAGNOSIS — E04.1 NONTOXIC SINGLE THYROID NODULE: ICD-10-CM
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
## 2022-12-23 DIAGNOSIS — Z91.040 LATEX ALLERGY STATUS: ICD-10-CM

## 2022-12-23 DIAGNOSIS — Z77.21 CONTACT WITH AND (SUSPECTED) EXPOSURE TO POTENTIALLY HAZARDOUS BODY FLUIDS: ICD-10-CM

## 2022-12-23 DIAGNOSIS — Z88.8 ALLERGY STATUS TO OTHER DRUGS, MEDICAMENTS AND BIOLOGICAL SUBSTANCES STATUS: ICD-10-CM

## 2022-12-23 DIAGNOSIS — Z91.048 OTHER NONMEDICINAL SUBSTANCE ALLERGY STATUS: ICD-10-CM

## 2022-12-23 DIAGNOSIS — Z15.09 GENETIC SUSCEPTIBILITY TO OTHER MALIGNANT NEOPLASM: ICD-10-CM

## 2022-12-23 DIAGNOSIS — D25.9 LEIOMYOMA OF UTERUS, UNSPECIFIED: ICD-10-CM

## 2022-12-23 DIAGNOSIS — Z90.710 ACQUIRED ABSENCE OF BOTH CERVIX AND UTERUS: ICD-10-CM

## 2022-12-23 DIAGNOSIS — J45.909 UNSPECIFIED ASTHMA, UNCOMPLICATED: ICD-10-CM

## 2022-12-23 DIAGNOSIS — D86.9 SARCOIDOSIS, UNSPECIFIED: ICD-10-CM

## 2022-12-23 LAB
HAV IGM SER-ACNC: SIGNIFICANT CHANGE UP
HBV CORE IGM SER-ACNC: SIGNIFICANT CHANGE UP
HBV SURFACE AG SER-ACNC: SIGNIFICANT CHANGE UP
HCV AB S/CO SERPL IA: 0.17 S/CO — SIGNIFICANT CHANGE UP (ref 0–0.99)
HCV AB SERPL-IMP: SIGNIFICANT CHANGE UP

## 2023-01-25 ENCOUNTER — APPOINTMENT (OUTPATIENT)
Dept: ORTHOPEDIC SURGERY | Facility: CLINIC | Age: 48
End: 2023-01-25

## 2023-03-28 ENCOUNTER — TRANSCRIPTION ENCOUNTER (OUTPATIENT)
Age: 48
End: 2023-03-28

## 2023-03-28 ENCOUNTER — FORM ENCOUNTER (OUTPATIENT)
Age: 48
End: 2023-03-28

## 2023-04-04 ENCOUNTER — APPOINTMENT (OUTPATIENT)
Dept: OBGYN | Facility: CLINIC | Age: 48
End: 2023-04-04
Payer: COMMERCIAL

## 2023-04-04 VITALS — WEIGHT: 169 LBS | BODY MASS INDEX: 29.94 KG/M2 | SYSTOLIC BLOOD PRESSURE: 118 MMHG | DIASTOLIC BLOOD PRESSURE: 72 MMHG

## 2023-04-04 DIAGNOSIS — N89.8 OTHER SPECIFIED NONINFLAMMATORY DISORDERS OF VAGINA: ICD-10-CM

## 2023-04-04 DIAGNOSIS — R92.2 INCONCLUSIVE MAMMOGRAM: ICD-10-CM

## 2023-04-04 DIAGNOSIS — Z12.72 ENCOUNTER FOR SCREENING FOR MALIGNANT NEOPLASM OF VAGINA: ICD-10-CM

## 2023-04-04 DIAGNOSIS — Z01.411 ENCOUNTER FOR GYNECOLOGICAL EXAMINATION (GENERAL) (ROUTINE) WITH ABNORMAL FINDINGS: ICD-10-CM

## 2023-04-04 DIAGNOSIS — L30.9 DERMATITIS, UNSPECIFIED: ICD-10-CM

## 2023-04-04 PROCEDURE — 99396 PREV VISIT EST AGE 40-64: CPT

## 2023-04-04 RX ORDER — TRIAMCINOLONE ACETONIDE 5 MG/G
0.5 CREAM TOPICAL
Qty: 30 | Refills: 1 | Status: ACTIVE | COMMUNITY
Start: 2023-04-04 | End: 1900-01-01

## 2023-04-04 NOTE — HISTORY OF PRESENT ILLNESS
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 She states she has noticed a vaginal odor ever since the surgery\par \par She requests a refill of Triamcinolone to treat her eczema\par She is s/p a colonoscopy 3/29/23 and has been advised to have one every year.\par \par Her , age 60, was diagnosed with dementia and is no longer working\par \par She is a [employer] tech at  [Mammogramdate] : 9/7/22 [BoneDensityDate] : 5/14/20 [FreeTextEntry1] : 2022

## 2023-04-04 NOTE — PHYSICAL EXAM
[Appropriately responsive] : appropriately responsive [Alert] : alert [No Acute Distress] : no acute distress [No Lymphadenopathy] : no lymphadenopathy [Oriented x3] : oriented x3 [Examination Of The Breasts] : a normal appearance [No Discharge] : no discharge [No Masses] : no breast masses were palpable [Labia Majora] : normal [Labia Minora] : normal [Normal] : normal [Atrophy] : atrophy [Erythematous] : erythema [Absent] : absent [Uterine Adnexae] : normal [FreeTextEntry4] : scant yellow discharge [FreeTextEntry5] : s

## 2023-04-04 NOTE — DISCUSSION/SUMMARY
[FreeTextEntry1] : 1) vaginal pap performed\par 2) significant vaginal atrophy noted. 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 advised to use vaginal moisturizers for now\par 3) affirm obtained to r/o infection\par 4) rx for screening mammo/sono issued for 2023\par \par return to office in one year, sooner prn

## 2023-04-05 LAB — HPV HIGH+LOW RISK DNA PNL CVX: NOT DETECTED

## 2023-04-06 ENCOUNTER — TRANSCRIPTION ENCOUNTER (OUTPATIENT)
Age: 48
End: 2023-04-06

## 2023-04-06 LAB
CANDIDA VAG CYTO: NOT DETECTED
G VAGINALIS+PREV SP MTYP VAG QL MICRO: NOT DETECTED
T VAGINALIS VAG QL WET PREP: NOT DETECTED

## 2023-04-10 LAB — CYTOLOGY CVX/VAG DOC THIN PREP: ABNORMAL

## 2023-06-01 ENCOUNTER — FORM ENCOUNTER (OUTPATIENT)
Age: 48
End: 2023-06-01

## 2023-09-01 ENCOUNTER — TRANSCRIPTION ENCOUNTER (OUTPATIENT)
Age: 48
End: 2023-09-01

## 2023-10-18 ENCOUNTER — APPOINTMENT (OUTPATIENT)
Dept: ORTHOPEDIC SURGERY | Facility: CLINIC | Age: 48
End: 2023-10-18
Payer: COMMERCIAL

## 2023-10-18 VITALS — BODY MASS INDEX: 29.95 KG/M2 | HEIGHT: 63 IN | WEIGHT: 169 LBS

## 2023-10-18 DIAGNOSIS — M23.203 DERANGEMENT OF UNSPECIFIED MEDIAL MENISCUS DUE TO OLD TEAR OR INJURY, RIGHT KNEE: ICD-10-CM

## 2023-10-18 DIAGNOSIS — M23.006 CYSTIC MENISCUS, UNSPECIFIED MENISCUS, RIGHT KNEE: ICD-10-CM

## 2023-10-18 PROCEDURE — 99214 OFFICE O/P EST MOD 30 MIN: CPT | Mod: 25

## 2023-10-18 PROCEDURE — 20610 DRAIN/INJ JOINT/BURSA W/O US: CPT | Mod: RT

## 2023-10-18 RX ORDER — MONTELUKAST 10 MG/1
10 TABLET, FILM COATED ORAL
Qty: 90 | Refills: 0 | Status: COMPLETED | COMMUNITY
End: 2023-10-18

## 2023-10-18 RX ORDER — MAGNESIUM CITRATE 1.75 G/29.6ML
1.75 LIQUID ORAL
Qty: 1 | Refills: 0 | Status: COMPLETED | COMMUNITY
Start: 2022-02-22 | End: 2023-10-18

## 2023-10-18 RX ORDER — ALBUTEROL SULFATE 90 UG/1
108 (90 BASE) AEROSOL, METERED RESPIRATORY (INHALATION)
Qty: 1 | Refills: 2 | Status: COMPLETED | COMMUNITY
Start: 2018-02-16 | End: 2023-10-18

## 2023-10-18 RX ORDER — DICLOFENAC SODIUM 75 MG/1
75 TABLET, DELAYED RELEASE ORAL
Qty: 60 | Refills: 2 | Status: ACTIVE | COMMUNITY
Start: 2023-10-18 | End: 1900-01-01

## 2023-11-15 NOTE — ED ADULT TRIAGE NOTE - WEIGHT IN KG
Wound Care Plan:   1-Hydraguard lotion to bilateral buttocks, sacrum, and heels twice daily and as needed. 2-Elevate heels off of bed/chair surface to offload pressure. 3-Offloading air cushion in chair when out of bed. 4-Moisturize skin daily with skin nourishing lotion. 5-Abdomen folds--cleanse with soap and water, pat dry. Dust with nystatin powder twice daily. 6-Left lateral leg--cleanse with normal saline, pat dry. Apply Maxorb Ag to wound. Cover with ABD and wrap with roxann. Change dressing every other day and as needed with soilage/dislodgement. Follow-up with Dr. Deborah Mayen for left leg wound--call for appointment. 70.3

## 2023-11-16 ENCOUNTER — APPOINTMENT (OUTPATIENT)
Dept: ORTHOPEDIC SURGERY | Facility: CLINIC | Age: 48
End: 2023-11-16

## 2023-11-17 NOTE — REVIEW OF SYSTEMS
• Immediately report any new sign of illness to our surgeon’s office    • Masking is optional at our facility (masks will be provided at entrances for those who wish to use one). If you would like teammates to wear a mask, please request this of your care team the day of surgery.    • Our rooms are relatively small and if more than two visitors are coming with you to your procedure, we may ask that some individuals wait in the waiting room to minimize overcrowding.    • Please bring a cashless form of payment if you wish to obtain medications (should they be prescribed) from the hospital outpatient pharmacy following your procedure.    • Aware to bring walker with to procedure    • Message sent to office regarding the need for a T&S. Informed patient that we would place a call to him Monday if he needed to have this completed prior to procedure. He is currently not available to get it drawn this weekend.      [Nl] : Genitourinary [FreeTextEntry6] : see above

## 2023-12-04 ENCOUNTER — RX RENEWAL (OUTPATIENT)
Age: 48
End: 2023-12-04

## 2023-12-04 RX ORDER — PRAVASTATIN SODIUM 40 MG/1
40 TABLET ORAL
Qty: 90 | Refills: 3 | Status: ACTIVE | COMMUNITY
Start: 2023-08-31

## 2023-12-14 ENCOUNTER — APPOINTMENT (OUTPATIENT)
Dept: ORTHOPEDIC SURGERY | Facility: CLINIC | Age: 48
End: 2023-12-14
Payer: COMMERCIAL

## 2023-12-14 VITALS — HEIGHT: 63 IN | BODY MASS INDEX: 29.59 KG/M2 | WEIGHT: 167 LBS

## 2023-12-14 PROCEDURE — 99214 OFFICE O/P EST MOD 30 MIN: CPT

## 2023-12-15 NOTE — IMAGING
[de-identified] : The patient is a well appearing 48 year  old female of their stated age. Patient ambulates with a normal gait. Negative straight leg raise bilateral   Effected Knee: RIGHT                        ROM:  0-145 degrees Lachman: Negative Pivot Shift: Negative Anterior Drawer: Negative Posterior Drawer / Sag:Negative Varus Stress 0 degrees: Stable Varus Stress 30 degrees: Stable Valgus Stress 0 degrees: Stable Valgus Stress 30 degrees: Stable Medial Ej: POSITIVE  Lateral Ej: Negative Patella Glide: 2+ Patella Apprehension: Negative Patella Grind: Negative   Palpation: Medial Joint Line: TENDER  Lateral Joint Line: Nontender Medial Collateral Ligament: Nontender Lateral Collateral Ligament/PLC: Nontender Distal Femur: Nontender Proximal Tibia: Nontender Tibial Tubercle: Nontender Distal Pole Patella: Nontender Quadriceps Tendon: Nontender &  Intact Patella Tendon: Nontender &  Intact Medial Femoral Condyle: Nontender Medial Distal Hamstring/PES: Nontender Lateral Distal Hamstring: Nontender Vastus Medialis Oblique: Nontender  Iliotibial Band: Nontender & Intact Medial Patellofemoral Ligament: Nontender Adductor: Nontender Proximal GSC-Plantaris: Nontender Calf: Supple & Nontender   Inspection: Deformity: No Erythema: No Ecchymosis: No Abrasions: No Effusion: MILD Prepatella Bursitis: No Neurologic Exam: Sensation L4-S1: Grossly Intact Motor Exam: Quadriceps: 5 out of 5 Hamstrings: 5 out of 5 EHL: 5 out of 5 FHL: 5 out of 5 TA: 5 out of 5 GS: 5 out of 5 Circulatory/Pulses: Dorsalis Pedis: 2+ Posterior Tibialis: 2+ Additional Pertinent Findings: None   Contralateral Knee:                             ROM: 0-145 degrees Other Pertinent Findings: None   Assessment: The patient is a 48 year  old female  with right knee pain and radiographic and physical exam findings consistent with medial meniscus tear.   The patients condition is acute Documents/Results Reviewed Today: MRI right knee Tests/Studies Independently Interpreted Today: MRI right knee reveals complex tear of the medial meniscus with parameniscal cyst, popliteal cyst  Pertinent findings include: mild effusion, medial joint line tenderness, +medial ej  Confounding medical conditions/concerns: None   Plan: Discussed non-operative and operative treatment options including right knee arthroscopic partial medial meniscectomy, and any indicated procedures. All questions and concerns regarding the surgery were addressed. Went over the recovery timeline and expected outcomes following surgery. Patient elected to move forward with the surgical procedure.  Tests Ordered: Pre-op  Prescription Medications Ordered: Discussed appropriate use of OTC anti-inflammatories and topical analgesics (including but not limited to Aleve, Advil, Tylenol, Motrin, Ibuprofen, Voltaren gel, etc.)  Braces/DME Ordered: None Activity/Work/Sports Status: None Additional Instructions: None Follow-Up: 2 weeks post-op   The patient's current medication management of their orthopedic diagnosis was reviewed today: (1) We discussed a comprehensive treatment plan that included possible pharmaceutical management involving the use of prescription strength medications including but not limited to options such as oral Naprosyn 500mg BID, once daily Meloxicam 15 mg, or 500-650 mg Tylenol versus over the counter oral medications and topical prescription NSAID Pennsaid vs over the counter Voltaren gel.  Based on our extensive discussion, the patient declined prescription medication and will use over the counter Advil, Alleve, Voltaren Gel or Tylenol as directed. (2) There is a moderate risk of morbidity with further treatment, especially from use of prescription strength medications and possible side effects of these medications which include upset stomach with oral medications, skin reactions to topical medications and cardiac/renal issues with long term use. (3) I recommended that the patient follow-up with their medical physician to discuss any significant specific potential issues with long term medication use such as interactions with current medications or with exacerbation of underlying medical comorbidities. (4) The benefits and risks associated with use of injectable, oral or topical, prescription and over the counter anti-inflammatory medications were discussed with the patient. The patient voiced understanding of the risks including but not limited to bleeding, stroke, kidney dysfunction, heart disease, and were referred to the black box warning label for further information.  Consent:  Conservative treatment, nontreatment, nonsurgical intervention and surgical intervention treatment options have been reviewed with the patient.  The patient continues to be symptomatic and has failed conservative treatment, and elects to move forward with surgical intervention.  The patient is indicated for right knee arthroscopic partial medial meniscectomy and all indicated procedures. As such the alternatives, benefits and risks, of the above procedure, including but not limited to bleeding, infection, neurovascular injury, loss of limb, loss of life,  DVT, PE, RSD, inability to return to previous level of activity, inability to return to previous level of employment, advancement of or to osteoarthritic changes, joint instability or motion loss, hardware failure or migration, failure to resolve all symptoms, failure to return to sports and need for further procedures, as well as specific risk of need for future joint arthroplasty were discussed with the patient and/or their legal guardian who agreed to move forward with surgical intervention.  They have reviewed and signed the consent form today after expressing understanding of the above documented conversation. The patient or their representative will contact my office as instructed on the preoperative instruction sheet they received today to schedule surgery in a timely manner as discussed. Over 25 minutes were spent on this encounter including time with the patient and over 15 minutes spent on counseling and coordination of care.   I, Jeanette Watson, attest that this documentation has been prepared under the direction and in the presence of Provider Dr. Irvin Prescott.  The documentation recorded by the scribe accurately reflects the services I, Dr. Irvin Prescott, personally performed and the decisions made by me.

## 2023-12-15 NOTE — HISTORY OF PRESENT ILLNESS
[de-identified] : The patient is a 48 year  old right hand dominant female who presents today complaining of right knee pain.   Date of Injury/Onset: 9/15/23 Pain:    At Rest: 3/10  With Activity:  8/10  Mechanism of injury: gradual onset, no ABISAI This is NOT a Work Related Injury being treated under Worker's Compensation. This is NOT an athletic injury occurring associated with an interscholastic or organized sports team. Quality of symptoms: sharp with activity, pressure, aching Improves with: rest Worse with: pivoting, twisting, driving Prior treatment: Dr. 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

## 2023-12-15 NOTE — DATA REVIEWED
[MRI] : MRI [Right] : of the right [Knee] : knee [Report was reviewed and noted in the chart] : The report was reviewed and noted in the chart [I independently reviewed and interpreted images and report] : I independently reviewed and interpreted images and report [I reviewed the films/CD and additionally noted] : I reviewed the films/CD and additionally noted [FreeTextEntry1] : complex tear of the medial meniscus with parameniscal cyst, popliteal cyst

## 2023-12-17 ENCOUNTER — NON-APPOINTMENT (OUTPATIENT)
Age: 48
End: 2023-12-17

## 2023-12-19 ENCOUNTER — NON-APPOINTMENT (OUTPATIENT)
Age: 48
End: 2023-12-19

## 2023-12-22 RX ORDER — ONDANSETRON 4 MG/1
4 TABLET ORAL
Qty: 15 | Refills: 0 | Status: ACTIVE | COMMUNITY
Start: 2023-12-22 | End: 1900-01-01

## 2023-12-22 RX ORDER — DOCUSATE SODIUM 100 MG/1
100 CAPSULE ORAL 3 TIMES DAILY
Qty: 21 | Refills: 0 | Status: ACTIVE | COMMUNITY
Start: 2023-12-22 | End: 1900-01-01

## 2023-12-22 RX ORDER — HYDROCODONE BITARTRATE AND ACETAMINOPHEN 5; 325 MG/1; MG/1
5-325 TABLET ORAL
Qty: 20 | Refills: 0 | Status: ACTIVE | COMMUNITY
Start: 2023-12-22 | End: 1900-01-01

## 2023-12-26 ENCOUNTER — APPOINTMENT (OUTPATIENT)
Dept: ORTHOPEDIC SURGERY | Facility: AMBULATORY SURGERY CENTER | Age: 48
End: 2023-12-26
Payer: COMMERCIAL

## 2023-12-26 PROCEDURE — 29881 ARTHRS KNE SRG MNISECTMY M/L: CPT | Mod: AS,RT

## 2023-12-26 PROCEDURE — 29881 ARTHRS KNE SRG MNISECTMY M/L: CPT | Mod: RT

## 2024-01-11 ENCOUNTER — APPOINTMENT (OUTPATIENT)
Dept: ORTHOPEDIC SURGERY | Facility: CLINIC | Age: 49
End: 2024-01-11
Payer: COMMERCIAL

## 2024-01-11 VITALS — HEIGHT: 63 IN | BODY MASS INDEX: 29.59 KG/M2 | WEIGHT: 167 LBS

## 2024-01-11 DIAGNOSIS — M25.561 PAIN IN RIGHT KNEE: ICD-10-CM

## 2024-01-11 DIAGNOSIS — S83.241A OTHER TEAR OF MEDIAL MENISCUS, CURRENT INJURY, RIGHT KNEE, INITIAL ENCOUNTER: ICD-10-CM

## 2024-01-11 PROCEDURE — 99024 POSTOP FOLLOW-UP VISIT: CPT

## 2024-01-12 NOTE — PHYSICAL EXAM
[de-identified] : The patient is a well appearing 48 year old female of their stated age. Patient ambulates with a normal gait. Negative straight leg raise bilateral  Surgical site: Right knee   Incision sites: Well approximated, clean, dry, intact, without drainage, without erythema  Range of motion: 0-110   Motor Testing: Quadriceps muscle firing    Stability Testing: Limited by pain   Swelling/Effusion: None   Tenderness to palpation: None   Provocative testing: Limited by pain   Right Calf: soft and nontender  Left Calf: soft and nontender    Neurovascular Examination: Grossly intact, 2+ distal pulses  Contralateral Extremity: Examination grossly benign    Assessment & Plan: The patient is approximately 2 weeks s/p right knee EUA partial medial meniscectomy and chondroplasty medial femoral condyle (DOS: 12/26/2023). Sutures removed, no Steri Strips applied secondary to adhesive allergy. The patient is instructed on wound management. Reviewed and discussed operative images with patient in office today. The patient's post-op plan, protocol and activity modifications have been thoroughly discussed and the patient expressed understanding. Continue physical therapy to work on strengthening. The patient will control pain as discussed & continue ice and elevation as needed. The patient otherwise may advance activity as discussed. She will plan to return to work on 1/22/2024. Follow up 4 weeks.    The patient's current medication management of their orthopedic diagnosis was reviewed today: (1) We discussed a comprehensive treatment plan that included possible pharmaceutical management involving the use of prescription strength medications including but not limited to options such as oral Naprosyn 500mg BID, once daily Meloxicam 15 mg, or 500-650 mg Tylenol versus over the counter oral medications and topical prescription NSAID Pennsaid vs over the counter Voltaren gel.  Based on our extensive discussion, the patient declined prescription medication and will use over the counter Advil, Alleve, Voltaren Gel or Tylenol as directed. (2) There is a moderate risk of morbidity with further treatment, especially from use of prescription strength medications and possible side effects of these medications which include upset stomach with oral medications, skin reactions to topical medications and cardiac/renal issues with long term use. (3) I recommended that the patient follow-up with their medical physician to discuss any significant specific potential issues with long term medication use such as interactions with current medications or with exacerbation of underlying medical comorbidities. (4) The benefits and risks associated with use of injectable, oral or topical, prescription and over the counter anti-inflammatory medications were discussed with the patient. The patient voiced understanding of the risks including but not limited to bleeding, stroke, kidney dysfunction, heart disease, and were referred to the black box warning label for further information.   IJennifer attest that this documentation has been prepared under the direction and in the presence of Provider Dr. Irvin Prescott.    The documentation recorded by the scribe accurately reflects the service CAMPOS Caba PA-C personally performed and the decisions made by me. The patient was seen by me under the direct supervision of Dr. Irvin Prescott

## 2024-01-12 NOTE — HISTORY OF PRESENT ILLNESS
[de-identified] : The patient is s/p Right knee EUA, [place] [facility] [place]: 12[place]26/23 Pain:    At Rest: 2/10  With Activity:  6/10  Mechanism of injury: gradual onset, no ABISAI This is NOT a Work Related Injury being treated under Worker's Compensation. This is NOT an athletic injury occurring associated with an interscholastic or organized sports team. 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 Change since last visit: patient is doing well post operatively, denies fevers/chills Additional Information: None

## 2024-02-05 ENCOUNTER — APPOINTMENT (OUTPATIENT)
Dept: ORTHOPEDIC SURGERY | Facility: CLINIC | Age: 49
End: 2024-02-05

## 2024-04-16 ENCOUNTER — APPOINTMENT (OUTPATIENT)
Dept: OBGYN | Facility: CLINIC | Age: 49
End: 2024-04-16
Payer: COMMERCIAL

## 2024-04-16 VITALS
WEIGHT: 150 LBS | HEIGHT: 63 IN | DIASTOLIC BLOOD PRESSURE: 68 MMHG | HEART RATE: 90 BPM | BODY MASS INDEX: 26.58 KG/M2 | SYSTOLIC BLOOD PRESSURE: 120 MMHG | RESPIRATION RATE: 16 BRPM

## 2024-04-16 DIAGNOSIS — Z12.4 ENCOUNTER FOR SCREENING FOR MALIGNANT NEOPLASM OF CERVIX: ICD-10-CM

## 2024-04-16 DIAGNOSIS — Z12.39 ENCOUNTER FOR OTHER SCREENING FOR MALIGNANT NEOPLASM OF BREAST: ICD-10-CM

## 2024-04-16 DIAGNOSIS — Z01.419 ENCOUNTER FOR GYNECOLOGICAL EXAMINATION (GENERAL) (ROUTINE) W/OUT ABNORMAL FINDINGS: ICD-10-CM

## 2024-04-16 PROCEDURE — 99396 PREV VISIT EST AGE 40-64: CPT

## 2024-04-16 NOTE — DISCUSSION/SUMMARY
[FreeTextEntry1] : 1) vaginal pap performed, +discharge 2) rx for screening mammo issued 3) pt due to f/u for colonoscopy later this year.  Return to office in 1-3 years

## 2024-04-16 NOTE — HISTORY OF PRESENT ILLNESS
[Patient reported bone density results were normal] : Patient reported bone density results were normal [TextBox_4] : Alayna is a 48 y/o  who presents today for an annual exam.  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  Her , age 61 has dementia, is no longer working.  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

## 2024-04-16 NOTE — PHYSICAL EXAM
[Appropriately responsive] : appropriately responsive [Alert] : alert [No Acute Distress] : no acute distress [No Lymphadenopathy] : no lymphadenopathy [Oriented x3] : oriented x3 [Examination Of The Breasts] : a normal appearance [No Discharge] : no discharge [No Masses] : no breast masses were palpable [Labia Majora] : normal [Normal] :  normal [Atrophy] : atrophy [Absent] : absent [FreeTextEntry4] : +discharge

## 2024-04-17 LAB — HPV HIGH+LOW RISK DNA PNL CVX: NOT DETECTED

## 2024-04-19 LAB — CYTOLOGY CVX/VAG DOC THIN PREP: NORMAL

## 2024-05-01 ENCOUNTER — NON-APPOINTMENT (OUTPATIENT)
Age: 49
End: 2024-05-01

## 2024-11-05 ENCOUNTER — NON-APPOINTMENT (OUTPATIENT)
Age: 49
End: 2024-11-05

## 2024-11-16 NOTE — ASU PREOP CHECKLIST - NS PREOP CHK CHLOROHEX WASH
Rx not available at [facility], RN calls [facility] and they do not have enough, RN calls [facility] and they are able to fill the Zithromax, order called in to Dannielle at [facility].    N/A

## 2025-01-02 ENCOUNTER — APPOINTMENT (OUTPATIENT)
Dept: ORTHOPEDIC SURGERY | Facility: CLINIC | Age: 50
End: 2025-01-02
Payer: OTHER MISCELLANEOUS

## 2025-01-02 VITALS — HEIGHT: 63 IN | WEIGHT: 150 LBS | BODY MASS INDEX: 26.58 KG/M2

## 2025-01-02 DIAGNOSIS — M25.512 PAIN IN RIGHT SHOULDER: ICD-10-CM

## 2025-01-02 DIAGNOSIS — M75.32 CALCIFIC TENDINITIS OF RIGHT SHOULDER: ICD-10-CM

## 2025-01-02 DIAGNOSIS — M75.31 CALCIFIC TENDINITIS OF RIGHT SHOULDER: ICD-10-CM

## 2025-01-02 DIAGNOSIS — M25.511 PAIN IN RIGHT SHOULDER: ICD-10-CM

## 2025-01-02 PROCEDURE — J3490M: CUSTOM

## 2025-01-02 PROCEDURE — 99214 OFFICE O/P EST MOD 30 MIN: CPT | Mod: 25

## 2025-01-02 PROCEDURE — 73030 X-RAY EXAM OF SHOULDER: CPT | Mod: RT

## 2025-01-02 PROCEDURE — 72040 X-RAY EXAM NECK SPINE 2-3 VW: CPT

## 2025-01-02 PROCEDURE — 20610 DRAIN/INJ JOINT/BURSA W/O US: CPT | Mod: LT

## 2025-01-02 PROCEDURE — 73010 X-RAY EXAM OF SHOULDER BLADE: CPT | Mod: RT

## 2025-01-02 RX ORDER — NAPROXEN 500 MG/1
500 TABLET ORAL
Qty: 30 | Refills: 1 | Status: ACTIVE | COMMUNITY
Start: 2025-01-02 | End: 1900-01-01

## 2025-02-06 ENCOUNTER — APPOINTMENT (OUTPATIENT)
Dept: ORTHOPEDIC SURGERY | Facility: CLINIC | Age: 50
End: 2025-02-06
Payer: OTHER MISCELLANEOUS

## 2025-02-06 VITALS — HEIGHT: 63 IN | BODY MASS INDEX: 26.58 KG/M2 | WEIGHT: 150 LBS

## 2025-02-06 DIAGNOSIS — M25.512 PAIN IN RIGHT SHOULDER: ICD-10-CM

## 2025-02-06 DIAGNOSIS — M25.511 PAIN IN RIGHT SHOULDER: ICD-10-CM

## 2025-02-06 DIAGNOSIS — M75.32 CALCIFIC TENDINITIS OF RIGHT SHOULDER: ICD-10-CM

## 2025-02-06 DIAGNOSIS — M75.31 CALCIFIC TENDINITIS OF RIGHT SHOULDER: ICD-10-CM

## 2025-02-06 PROCEDURE — 99213 OFFICE O/P EST LOW 20 MIN: CPT | Mod: 25

## 2025-02-06 PROCEDURE — 20610 DRAIN/INJ JOINT/BURSA W/O US: CPT | Mod: LT

## 2025-02-06 PROCEDURE — J3490M: CUSTOM

## 2025-03-20 ENCOUNTER — APPOINTMENT (OUTPATIENT)
Dept: ORTHOPEDIC SURGERY | Facility: CLINIC | Age: 50
End: 2025-03-20
Payer: OTHER MISCELLANEOUS

## 2025-03-20 VITALS — BODY MASS INDEX: 26.58 KG/M2 | HEIGHT: 63 IN | WEIGHT: 150 LBS

## 2025-03-20 DIAGNOSIS — M75.32 CALCIFIC TENDINITIS OF RIGHT SHOULDER: ICD-10-CM

## 2025-03-20 DIAGNOSIS — M75.31 CALCIFIC TENDINITIS OF RIGHT SHOULDER: ICD-10-CM

## 2025-03-20 PROCEDURE — 99213 OFFICE O/P EST LOW 20 MIN: CPT

## 2025-04-03 ENCOUNTER — RESULT REVIEW (OUTPATIENT)
Age: 50
End: 2025-04-03

## 2025-04-16 ENCOUNTER — APPOINTMENT (OUTPATIENT)
Dept: ORTHOPEDIC SURGERY | Facility: CLINIC | Age: 50
End: 2025-04-16

## 2025-04-24 ENCOUNTER — APPOINTMENT (OUTPATIENT)
Dept: ORTHOPEDIC SURGERY | Facility: CLINIC | Age: 50
End: 2025-04-24

## 2025-04-30 ENCOUNTER — APPOINTMENT (OUTPATIENT)
Dept: ORTHOPEDIC SURGERY | Facility: CLINIC | Age: 50
End: 2025-04-30

## 2025-04-30 VITALS — WEIGHT: 155 LBS | BODY MASS INDEX: 27.46 KG/M2 | HEIGHT: 63 IN

## 2025-04-30 DIAGNOSIS — M50.20 OTHER CERVICAL DISC DISPLACEMENT, UNSPECIFIED CERVICAL REGION: ICD-10-CM

## 2025-04-30 DIAGNOSIS — M54.12 RADICULOPATHY, CERVICAL REGION: ICD-10-CM

## 2025-04-30 PROCEDURE — 99213 OFFICE O/P EST LOW 20 MIN: CPT | Mod: 25

## 2025-04-30 PROCEDURE — 72040 X-RAY EXAM NECK SPINE 2-3 VW: CPT

## 2025-04-30 PROCEDURE — 20552 NJX 1/MLT TRIGGER POINT 1/2: CPT

## 2025-04-30 PROCEDURE — J3490M: CUSTOM

## 2025-05-22 ENCOUNTER — APPOINTMENT (OUTPATIENT)
Dept: ORTHOPEDIC SURGERY | Facility: CLINIC | Age: 50
End: 2025-05-22
Payer: OTHER MISCELLANEOUS

## 2025-05-22 ENCOUNTER — RESULT REVIEW (OUTPATIENT)
Age: 50
End: 2025-05-22

## 2025-05-22 VITALS — BODY MASS INDEX: 27.46 KG/M2 | WEIGHT: 155 LBS | HEIGHT: 63 IN

## 2025-05-22 DIAGNOSIS — M75.31 CALCIFIC TENDINITIS OF RIGHT SHOULDER: ICD-10-CM

## 2025-05-22 DIAGNOSIS — M75.32 CALCIFIC TENDINITIS OF RIGHT SHOULDER: ICD-10-CM

## 2025-05-22 PROCEDURE — 99214 OFFICE O/P EST MOD 30 MIN: CPT

## 2025-05-28 ENCOUNTER — APPOINTMENT (OUTPATIENT)
Dept: ORTHOPEDIC SURGERY | Facility: CLINIC | Age: 50
End: 2025-05-28

## 2025-06-11 ENCOUNTER — APPOINTMENT (OUTPATIENT)
Dept: ORTHOPEDIC SURGERY | Facility: CLINIC | Age: 50
End: 2025-06-11
Payer: OTHER MISCELLANEOUS

## 2025-06-11 VITALS — WEIGHT: 155 LBS | BODY MASS INDEX: 27.46 KG/M2 | HEIGHT: 63 IN

## 2025-06-11 PROBLEM — M48.02 FORAMINAL STENOSIS OF CERVICAL REGION: Status: ACTIVE | Noted: 2025-06-11

## 2025-06-11 PROCEDURE — 99214 OFFICE O/P EST MOD 30 MIN: CPT

## 2025-06-24 ENCOUNTER — NON-APPOINTMENT (OUTPATIENT)
Age: 50
End: 2025-06-24

## 2025-06-24 ENCOUNTER — APPOINTMENT (OUTPATIENT)
Dept: FAMILY MEDICINE | Facility: CLINIC | Age: 50
End: 2025-06-24
Payer: COMMERCIAL

## 2025-06-24 VITALS
HEIGHT: 63 IN | HEART RATE: 100 BPM | SYSTOLIC BLOOD PRESSURE: 118 MMHG | WEIGHT: 170 LBS | OXYGEN SATURATION: 95 % | DIASTOLIC BLOOD PRESSURE: 74 MMHG | BODY MASS INDEX: 30.12 KG/M2

## 2025-06-24 PROBLEM — E66.3 OVERWEIGHT: Status: ACTIVE | Noted: 2025-06-24

## 2025-06-24 PROBLEM — E66.9 OBESITY, UNSPECIFIED CLASS, UNSPECIFIED OBESITY TYPE, UNSPECIFIED WHETHER SERIOUS COMORBIDITY PRESENT: Status: ACTIVE | Noted: 2025-06-24

## 2025-06-24 PROCEDURE — 36415 COLL VENOUS BLD VENIPUNCTURE: CPT

## 2025-06-24 PROCEDURE — 99386 PREV VISIT NEW AGE 40-64: CPT

## 2025-06-25 LAB
ALBUMIN SERPL ELPH-MCNC: 4.4 G/DL
ALP BLD-CCNC: 66 U/L
ALT SERPL-CCNC: 17 U/L
ANION GAP SERPL CALC-SCNC: 15 MMOL/L
AST SERPL-CCNC: 25 U/L
BASOPHILS # BLD AUTO: 0.03 K/UL
BASOPHILS NFR BLD AUTO: 0.6 %
BILIRUB SERPL-MCNC: 1.2 MG/DL
BUN SERPL-MCNC: 14 MG/DL
CALCIUM SERPL-MCNC: 9.7 MG/DL
CHLORIDE SERPL-SCNC: 105 MMOL/L
CHOLEST SERPL-MCNC: 272 MG/DL
CO2 SERPL-SCNC: 22 MMOL/L
CREAT SERPL-MCNC: 0.7 MG/DL
EGFRCR SERPLBLD CKD-EPI 2021: 105 ML/MIN/1.73M2
EOSINOPHIL # BLD AUTO: 0.14 K/UL
EOSINOPHIL NFR BLD AUTO: 2.9 %
ESTIMATED AVERAGE GLUCOSE: 111 MG/DL
GLUCOSE SERPL-MCNC: 116 MG/DL
HBA1C MFR BLD HPLC: 5.5 %
HCT VFR BLD CALC: 42.6 %
HDLC SERPL-MCNC: 72 MG/DL
HGB BLD-MCNC: 14 G/DL
IMM GRANULOCYTES NFR BLD AUTO: 0.2 %
LDLC SERPL-MCNC: 191 MG/DL
LYMPHOCYTES # BLD AUTO: 1.49 K/UL
LYMPHOCYTES NFR BLD AUTO: 30.5 %
MAN DIFF?: NORMAL
MCHC RBC-ENTMCNC: 31.2 PG
MCHC RBC-ENTMCNC: 32.9 G/DL
MCV RBC AUTO: 94.9 FL
MONOCYTES # BLD AUTO: 0.36 K/UL
MONOCYTES NFR BLD AUTO: 7.4 %
NEUTROPHILS # BLD AUTO: 2.85 K/UL
NEUTROPHILS NFR BLD AUTO: 58.4 %
NONHDLC SERPL-MCNC: 201 MG/DL
PLATELET # BLD AUTO: 243 K/UL
POTASSIUM SERPL-SCNC: 4 MMOL/L
PROT SERPL-MCNC: 7 G/DL
RBC # BLD: 4.49 M/UL
RBC # FLD: 12.6 %
SODIUM SERPL-SCNC: 141 MMOL/L
TRIGL SERPL-MCNC: 64 MG/DL
TSH SERPL-ACNC: 1.56 UIU/ML
WBC # FLD AUTO: 4.88 K/UL

## 2025-06-26 RX ORDER — TIRZEPATIDE 2.5 MG/.5ML
2.5 INJECTION, SOLUTION SUBCUTANEOUS
Qty: 1 | Refills: 0 | Status: ACTIVE | COMMUNITY
Start: 2025-06-24

## 2025-07-01 RX ORDER — ROSUVASTATIN CALCIUM 10 MG/1
10 TABLET, FILM COATED ORAL
Qty: 90 | Refills: 0 | Status: ACTIVE | COMMUNITY
Start: 2025-06-25

## 2025-07-15 NOTE — ED ADULT NURSE NOTE - OBJECTIVE STATEMENT
Performing Laboratory: [id number] Bill For Surgical Tray: no Expected Date Of Service: 07/15/2025 Billing Type: Third-Party Bill Atrial fibrillation and flutter pt here for lower back pain, pt states she tried to  a container of soil and pulled her back

## 2025-07-16 ENCOUNTER — APPOINTMENT (OUTPATIENT)
Dept: ORTHOPEDIC SURGERY | Facility: CLINIC | Age: 50
End: 2025-07-16

## 2025-09-02 ENCOUNTER — APPOINTMENT (OUTPATIENT)
Dept: PAIN MANAGEMENT | Facility: CLINIC | Age: 50
End: 2025-09-02

## 2025-09-10 ENCOUNTER — APPOINTMENT (OUTPATIENT)
Dept: CARDIOLOGY | Facility: CLINIC | Age: 50
End: 2025-09-10
Payer: COMMERCIAL

## 2025-09-10 VITALS
DIASTOLIC BLOOD PRESSURE: 75 MMHG | BODY MASS INDEX: 26.58 KG/M2 | HEIGHT: 63 IN | OXYGEN SATURATION: 92 % | HEART RATE: 94 BPM | WEIGHT: 150 LBS | SYSTOLIC BLOOD PRESSURE: 122 MMHG

## 2025-09-10 DIAGNOSIS — Z86.73 PERSONAL HISTORY OF TRANSIENT ISCHEMIC ATTACK (TIA), AND CEREBRAL INFARCTION W/OUT RESIDUAL DEFICITS: ICD-10-CM

## 2025-09-10 DIAGNOSIS — H53.8 OTHER VISUAL DISTURBANCES: ICD-10-CM

## 2025-09-10 DIAGNOSIS — E78.5 HYPERLIPIDEMIA, UNSPECIFIED: ICD-10-CM

## 2025-09-10 PROCEDURE — 99215 OFFICE O/P EST HI 40 MIN: CPT

## 2025-09-10 PROCEDURE — G2211 COMPLEX E/M VISIT ADD ON: CPT

## 2025-09-10 PROCEDURE — 93000 ELECTROCARDIOGRAM COMPLETE: CPT

## 2025-09-16 ENCOUNTER — APPOINTMENT (OUTPATIENT)
Dept: FAMILY MEDICINE | Facility: CLINIC | Age: 50
End: 2025-09-16